# Patient Record
Sex: MALE | Race: WHITE | NOT HISPANIC OR LATINO | Employment: OTHER | URBAN - METROPOLITAN AREA
[De-identification: names, ages, dates, MRNs, and addresses within clinical notes are randomized per-mention and may not be internally consistent; named-entity substitution may affect disease eponyms.]

---

## 2017-02-27 DIAGNOSIS — N52.8 OTHER MALE ERECTILE DYSFUNCTION: ICD-10-CM

## 2017-02-27 NOTE — TELEPHONE ENCOUNTER
Patient called for refill of viagra.  He also scheduled his annual physical for 3/21/17.   If cannot be filled, please call to advise patient. Ok to leave detailed message.  Please send to [facility] on [address] if approved.

## 2017-03-01 RX ORDER — SILDENAFIL 50 MG/1
25-100 TABLET, FILM COATED ORAL DAILY PRN
Qty: 6 TABLET | Refills: 0 | Status: CANCELLED | OUTPATIENT
Start: 2017-03-01

## 2017-03-01 NOTE — TELEPHONE ENCOUNTER
Routing refill request to provider for review/approval because:  -writer notes patient has history of STEMI and overdue for annual office visit, defer to provider    Routing to PCP.    Dr. Sanchez-Please sign if agree.    Thank you!  MARIELLE JohnsonN, RN      sildenafil (VIAGRA) 50 MG tablet      Last Written Prescription Date: 7/2/15  Last Fill Quantity: 6,  # refills: 11   Last Office Visit with [facility], [facility] or [facility] prescribing provider: 2/19/16 with Dr. Sanchez                                         Next 5 appointments (look out 90 days)     Mar 21, 2017 11:00 AM CDT   [facility]    [address] [place] [id number] [phone]            May 22, 2017 10:15 AM CDT   Return Visit with Merrick Plaza MD   [facility] AT [place] ([facility])    [address] [place] [id number] [phone]

## 2017-03-01 NOTE — TELEPHONE ENCOUNTER
Called patient and reviewed options per below from Dr. Sanchez.    Per patient:  1. He will wait until 3/21/17 office visit with Dr. Sanchez to address Viagra refill.    No further action needed from triage.    Closing encounter.    MARIELLE JohnsonN, RN

## 2017-03-07 DIAGNOSIS — I25.10 CORONARY ARTERY DISEASE INVOLVING NATIVE HEART WITHOUT ANGINA PECTORIS, UNSPECIFIED VESSEL OR LESION TYPE: Primary | ICD-10-CM

## 2017-03-07 NOTE — TELEPHONE ENCOUNTER
Disp Refills Start End CHANDLER   atorvastatin (LIPITOR) 80 MG tablet 30 tablet 11 2/19/2016  No   Sig: Take 1 tablet (80 mg) by mouth At Bedtime     Last Office Visit with [facility] or [facility] prescribing provider: 2-19-16    Next 5 appointments (look out 90 days)     Mar 21, 2017 11:00 AM CDT   PHYSICAL with Montrell Sanchez MD   [facility] ([address] [address]   [phone]            May 22, 2017 10:15 AM CDT   Return Visit with Merrick Plaza MD   [facility] ([facility])    [facility]  [facility] [id number] [phone]                   Lab Results   Component Value Date    CHOL 126 05/05/2016     Lab Results   Component Value Date    HDL 38 05/05/2016     Lab Results   Component Value Date    LDL 69 05/05/2016     07/02/2015     Lab Results   Component Value Date    TRIG 97 05/05/2016     Lab Results   Component Value Date    CHOLHDLRATIO 7.3 07/02/2015            Disp Refills Start End CHANDLER   lisinopril (PRINIVIL,ZESTRIL) 5 MG tablet 30 tablet 11 2/19/2016  No   Sig: Take 1 tablet (5 mg) by mouth daily       Last Office Visit with [facility], [facility] or [facility] prescribing provider: 2-19-16    Next 5 appointments (look out 90 days)     Mar 21, 2017 11:00 AM CDT   PHYSICAL with Montrell Sanchez MD   [facility] ([facility])    [address] [place] [id number]   [phone]            May 22, 2017 10:15 AM CDT   Return Visit with Merrick Plaza MD   [facility] ([facility])    [address]  [facility] [address] [phone]                   Potassium   Date Value Ref Range Status   02/19/2016 4.3 3.4 - 5.3 mmol/L Final     Creatinine   Date Value Ref Range Status   02/19/2016 0.97 0.66 - 1.25 mg/dL Final     BP Readings from Last 3 Encounters:    05/09/16 104/62   03/02/16 106/70   02/19/16 94/62            Disp Refills Start End CHANDLER   metoprolol (LOPRESSOR) 25 MG tablet 62 tablet 11 2/19/2016  No   Sig: Take 1 tablet (25 mg) by mouth 2 times daily       [facility] Visit with [email or web address] or [facility] prescribing provider:  2-19-16     Next 5 appointments (look out 90 days)     Mar 21, 2017 11:00 AM CDT   PHYSICAL with Montrell Sanchez MD   [facility] ([facility])    [address] [place] [address]   [phone]            May 22, 2017 10:15 AM CDT   Return Visit with Merrick Plaza MD   [facility] AT [place] ([email or web address])    [address] [place] [address]   [phone]                   BP Readings from Last 3 Encounters:   05/09/16 104/62   03/02/16 106/70   02/19/16 94/62     No results found for: MICROL  No results found for: MICROALBUMIN  Creatinine   Date Value Ref Range Status   02/19/2016 0.97 0.66 - 1.25 mg/dL Final   ]  GFR Estimate   Date Value Ref Range Status   02/19/2016 82 >60 mL/min/1.7m2 Final     Comment:     Non  GFR Calc   02/15/2016 76 >60 mL/min/1.7m2 Corrected     Comment:     CORRECTED ON 02/15 AT 0723: PREVIOUSLY REPORTED AS 76 Non  GFR   Calc     02/14/2016 80 >60 mL/min/1.7m2 Final     Comment:     Non  GFR Calc     GFR Estimate If Black   Date Value Ref Range Status   02/19/2016 >90   GFR Calc   >60 mL/min/1.7m2 Final   02/15/2016  >60 mL/min/1.7m2 Corrected    >90  CORRECTED ON 02/15 AT 0723: PREVIOUSLY REPORTED AS >90  GFR Calc     02/14/2016 >90   GFR Calc   >60 mL/min/1.7m2 Final     Lab Results   Component Value Date    CHOL 126 05/05/2016     Lab Results   Component Value Date    HDL 38 05/05/2016     Lab Results   Component Value Date    LDL 69 05/05/2016     07/02/2015     Lab Results   Component Value Date     TRIG 97 05/05/2016     Lab Results   Component Value Date    CHOLHDLRATIO 7.3 07/02/2015     Lab Results   Component Value Date     02/15/2016     Lab Results   Component Value Date    ALT 34 05/05/2016     Lab Results   Component Value Date    A1C 5.8 07/02/2015     Potassium   Date Value Ref Range Status   02/19/2016 4.3 3.4 - 5.3 mmol/L Final            Disp Refills Start End CHANDLER   ticagrelor (BRILINTA) 90 MG tablet 62 tablet 11 2/19/2016  No   Sig: Take 1 tablet (90 mg) by mouth every 12 hours      Last Office Visit with [facility], [facility] or [facility] prescribing provider:  2-19-16     Future Office Visit:    Next 5 appointments (look out 90 days)     Mar 21, 2017 11:00 AM CDT   PHYSICAL with Montrell Sanchez MD   [facility])    [address] [place] [address]   [phone]            May 22, 2017 10:15 AM CDT   Return Visit with Merrick Plaza MD   [facility] [place] ([facility])    [address] [place] [id number]   [phone]                   Lab Results   Component Value Date    WBC 7.0 02/15/2016     Lab Results   Component Value Date    RBC 5.06 02/15/2016     Lab Results   Component Value Date    HGB 15.6 02/15/2016     Lab Results   Component Value Date    HCT 45.3 02/15/2016     No components found for: MCT  Lab Results   Component Value Date    MCV 90 02/15/2016     Lab Results   Component Value Date    MCH 30.8 02/15/2016     Lab Results   Component Value Date    MCHC 34.4 02/15/2016     Lab Results   Component Value Date    RDW 13.2 02/15/2016     Lab Results   Component Value Date     02/15/2016     Lab Results   Component Value Date     02/15/2016     Lab Results   Component Value Date    ALT 34 05/05/2016     Creatinine   Date Value Ref Range Status   02/19/2016 0.97 0.66 - 1.25 mg/dL Final   ]

## 2017-03-09 RX ORDER — METOPROLOL TARTRATE 25 MG/1
25 TABLET, FILM COATED ORAL 2 TIMES DAILY
Qty: 60 TABLET | Refills: 0 | Status: SHIPPED | OUTPATIENT
Start: 2017-03-09 | End: 2017-05-22

## 2017-03-09 RX ORDER — LISINOPRIL 5 MG/1
5 TABLET ORAL DAILY
Qty: 30 TABLET | Refills: 0 | Status: SHIPPED | OUTPATIENT
Start: 2017-03-09 | End: 2017-05-22

## 2017-03-09 RX ORDER — ATORVASTATIN CALCIUM 80 MG/1
80 TABLET, FILM COATED ORAL AT BEDTIME
Qty: 30 TABLET | Refills: 0 | Status: SHIPPED | OUTPATIENT
Start: 2017-03-09 | End: 2017-05-22

## 2017-03-21 ENCOUNTER — OFFICE VISIT (OUTPATIENT)
Dept: FAMILY MEDICINE | Facility: CLINIC | Age: 52
End: 2017-03-21
Payer: COMMERCIAL

## 2017-03-21 VITALS
TEMPERATURE: 97.6 F | DIASTOLIC BLOOD PRESSURE: 70 MMHG | OXYGEN SATURATION: 100 % | SYSTOLIC BLOOD PRESSURE: 100 MMHG | WEIGHT: 187 LBS | HEART RATE: 64 BPM | BODY MASS INDEX: 27.7 KG/M2 | HEIGHT: 69 IN | RESPIRATION RATE: 16 BRPM

## 2017-03-21 DIAGNOSIS — N52.9 ERECTILE DYSFUNCTION, UNSPECIFIED ERECTILE DYSFUNCTION TYPE: ICD-10-CM

## 2017-03-21 DIAGNOSIS — Z00.01 ENCOUNTER FOR ROUTINE ADULT MEDICAL EXAM WITH ABNORMAL FINDINGS: Primary | ICD-10-CM

## 2017-03-21 DIAGNOSIS — I25.10 CORONARY ARTERY DISEASE INVOLVING NATIVE HEART WITHOUT ANGINA PECTORIS, UNSPECIFIED VESSEL OR LESION TYPE: ICD-10-CM

## 2017-03-21 PROCEDURE — 80061 LIPID PANEL: CPT | Performed by: FAMILY MEDICINE

## 2017-03-21 PROCEDURE — 80048 BASIC METABOLIC PNL TOTAL CA: CPT | Performed by: FAMILY MEDICINE

## 2017-03-21 PROCEDURE — 36415 COLL VENOUS BLD VENIPUNCTURE: CPT | Performed by: FAMILY MEDICINE

## 2017-03-21 PROCEDURE — 99396 PREV VISIT EST AGE 40-64: CPT | Performed by: FAMILY MEDICINE

## 2017-03-21 RX ORDER — SILDENAFIL CITRATE 20 MG/1
20-100 TABLET ORAL DAILY PRN
Qty: 30 TABLET | Refills: 0 | Status: SHIPPED
Start: 2017-03-21 | End: 2018-08-09

## 2017-03-21 NOTE — PROGRESS NOTES
SUBJECTIVE:     CC: Tip Aguilar is an 52 year old male who presents for preventative health visit.     Physical   Annual:     Getting at least 3 servings of Calcium per day::  Yes    Bi-annual eye exam::  NO    Dental care twice a year::  NO    Sleep apnea or symptoms of sleep apnea::  None    Diet::  Low fat/cholesterol    Frequency of exercise::  2-3 days/week    Duration of exercise::  15-30 minutes    Taking medications regularly::  Yes    Medication side effects::  Not applicable    Additional concerns today::  No    -------------------------------------    Today's PHQ-2 Score:   PHQ-2 ( 1999 [employer]) 3/21/2017   Q1: Little interest or pleasure in doing things -   Q2: Feeling down, depressed or hopeless -   PHQ-2 Score -   Little interest or pleasure in doing things Not at all   Feeling down, depressed or hopeless Not at all   PHQ-2 Score 0       Abuse: Current or Past(Physical, Sexual or Emotional)- No  Do you feel safe in your environment - Yes    Social History   Substance Use Topics     Smoking status: Never Smoker     Smokeless tobacco: Not on file     Alcohol use 0.0 oz/week     0 Standard drinks or equivalent per week      Comment: beer or wine     The patient does not drink >3 drinks per day nor >7 drinks per week.    Last PSA: No results found for: PSA    Recent Labs   Lab Test  05/05/16   0729  02/13/16   1320  07/02/15   0850   CHOL  126  202*  248*   HDL  38*  32*  34*   LDL  69  133*  Cannot estimate LDL when triglyceride exceeds 400 mg/dL  152*   TRIG  97  184*  415*   CHOLHDLRATIO   --    --   7.3*   NHDL  88  170*   --        Reviewed orders with patient. Reviewed health maintenance and updated orders accordingly - Yes    Reviewed and updated as needed this visit by clinical staff       Reviewed and updated as needed this visit by Provider        ROS:  C: NEGATIVE for fever, chills, change in weight  I: NEGATIVE for worrisome rashes, moles or lesions  E: NEGATIVE for vision changes or  "irritation  ENT: NEGATIVE for ear, mouth and throat problems  R: NEGATIVE for significant cough or SOB  CV: NEGATIVE for chest pain, palpitations or peripheral edema  GI: NEGATIVE for nausea, abdominal pain, heartburn, or change in bowel habits   male: erectile trouble worse. viagra at 50mg not all that helpful.   M: NEGATIVE for significant arthralgias or myalgia  N: NEGATIVE for weakness, dizziness or paresthesias  P: NEGATIVE for changes in mood or affect    Problem list, Medication list, Allergies, and Medical/Social/Surgical histories reviewed in Psychiatric and updated as appropriate.  OBJECTIVE:     /70 (BP Location: Left arm, Patient Position: Chair, Cuff Size: Adult Regular)  Pulse 64  Temp 97.6  F (36.4  C) (Oral)  Resp 16  Ht 5' 8.75\" (1.746 m)  Wt 187 lb (84.8 kg)  SpO2 100%  BMI 27.82 kg/m2  EXAM:  GENERAL: healthy, alert and no distress  EYES: Eyes grossly normal to inspection  HENT: ear canals and TM's normal, nose and mouth without ulcers or lesions  NECK: no adenopathy, no asymmetry, masses, or scars and thyroid normal to palpation  RESP: lungs clear to auscultation - no rales, rhonchi or wheezes  CV: regular rate and rhythm, normal S1 S2, no S3 or S4, no murmur, click or rub, no peripheral edema and peripheral pulses strong  ABDOMEN: soft, nontender, no hepatosplenomegaly, no masses and bowel sounds normal  MS: no gross musculoskeletal defects noted, no edema  SKIN: no suspicious lesions or rashes  NEURO: Normal strength and tone, mentation intact and speech normal  PSYCH: mentation appears normal, affect normal/bright    ASSESSMENT/PLAN:         ICD-10-CM    1. Encounter for routine adult medical exam with abnormal findings Z00.01 Lipid Profile with reflex to direct LDL     GASTROENTEROLOGY ADULT REF PROCEDURE ONLY     GASTROENTEROLOGY ADULT REF PROCEDURE ONLY   2. Coronary artery disease involving native heart without angina pectoris, unspecified vessel or lesion type I25.10 Basic metabolic " "panel   3. Erectile dysfunction, unspecified erectile dysfunction type N52.9 sildenafil (REVATIO/VIAGRA) 20 MG tablet   will check on duration for the brillinta and if cards has any trouble with me lowering the dose of metoprolol. Increase viagra up to 100mg dose for a trial.     COUNSELING:   Reviewed preventive health counseling, as reflected in patient instructions       Regular exercise       Healthy diet/nutrition       Colon cancer screening       reports that he has never smoked. He does not have any smokeless tobacco history on file.    Estimated body mass index is 27.82 kg/(m^2) as calculated from the following:    Height as of this encounter: 5' 8.75\" (1.746 m).    Weight as of this encounter: 187 lb (84.8 kg).   Weight management plan: Discussed healthy diet and exercise guidelines and patient will follow up in 12 months in clinic to re-evaluate.    Counseling Resources:  ATP IV Guidelines  Pooled Cohorts Equation Calculator  FRAX Risk Assessment  ICSI Preventive Guidelines  Dietary Guidelines for Americans, 2010  USDA's MyPlate  ASA Prophylaxis  Lung CA Screening    Montrell Sanchez MD  [facility]  Answers for HPI/ROS submitted by the patient on 3/21/2017   Q1: Little interest or pleasure in doing things: 0=Not at all  Q2: Feeling down, depressed or hopeless: 0=Not at all  PHQ-2 Score: 0    "

## 2017-03-21 NOTE — MR AVS SNAPSHOT
After Visit Summary   3/21/2017    Tip Aguilar    MRN: [id number]           Patient Information     Date Of Birth          1965        Visit Information        Provider Department      3/21/2017 11:00 AM Montrell Sanchez MD [facility]        Today's Diagnoses     Encounter for routine adult medical exam with abnormal findings    -  1    Coronary artery disease involving native heart without angina pectoris, unspecified vessel or lesion type        Erectile dysfunction, unspecified erectile dysfunction type          Care Instructions      Preventive Health Recommendations  Male Ages 50 - 64    Yearly exam:             See your health care provider every year in order to  o   Review health changes.   o   Discuss preventive care.    o   Review your medicines if your doctor has prescribed any.     Have a cholesterol test every 5 years, or more frequently if you are at risk for high cholesterol/heart disease.     Have a diabetes test (fasting glucose) every three years. If you are at risk for diabetes, you should have this test more often.     Have a colonoscopy at age 50, or have a yearly FIT test (stool test). These exams will check for colon cancer.      Talk with your health care provider about whether or not a prostate cancer screening test (PSA) is right for you.    You should be tested each year for STDs (sexually transmitted diseases), if you re at risk.     Shots: Get a flu shot each year. Get a tetanus shot every 10 years.     Nutrition:    Eat at least 5 servings of fruits and vegetables daily.     Eat whole-grain bread, whole-wheat pasta and brown rice instead of white grains and rice.     Talk to your provider about Calcium and Vitamin D.     Lifestyle    Exercise for at least 150 minutes a week (30 minutes a day, 5 days a week). This will help you control your weight and prevent disease.     Limit alcohol to one drink per day.     No smoking.     Wear sunscreen  to prevent skin cancer.     See your dentist every six months for an exam and cleaning.     See your eye doctor every 1 to 2 years.          Follow-ups after your visit        Additional Services     GASTROENTEROLOGY ADULT REF PROCEDURE ONLY       Last Lab Result: Creatinine (mg/dL)       Date                     Value                 02/19/2016               0.97             ----------  Body mass index is 27.82 kg/(m^2).     Needed:  No  Language:  English    Patient will be contacted to schedule procedure.     Please be aware that coverage of these services is subject to the terms and limitations of your health insurance plan.  Call member services at your health plan with any benefit or coverage questions.  Any procedures must be performed at a [facility] facility OR coordinated by your clinic's referral office.    Please bring the following with you to your appointment:    (1) Any X-Rays, CTs or MRIs which have been performed.  Contact the facility where they were done to arrange for  prior to your scheduled appointment.    (2) List of current medications   (3) This referral request   (4) Any documents/labs given to you for this referral            GASTROENTEROLOGY ADULT REF PROCEDURE ONLY       Last Lab Result: Creatinine (mg/dL)       Date                     Value                 02/19/2016               0.97             ----------  Body mass index is 27.82 kg/(m^2).     Needed:  No  Language:  English    Patient will be contacted to schedule procedure.     Please be aware that coverage of these services is subject to the terms and limitations of your health insurance plan.  Call member services at your health plan with any benefit or coverage questions.  Any procedures must be performed at a [facility] facility OR coordinated by your clinic's referral office.    Please bring the following with you to your appointment:    (1) Any X-Rays, CTs or MRIs which have been performed.  Contact  "the facility where they were done to arrange for  prior to your scheduled appointment.    (2) List of current medications   (3) This referral request   (4) Any documents/labs given to you for this referral                  Your next 10 appointments already scheduled     May 22, 2017 10:15 AM CDT   Return Visit with Merrick Plaza MD   [facility] ([facility])    [address] [place] [id number] [phone]              Who to contact     If you have questions or need follow up information about today's clinic visit or your schedule please contact [facility] directly at [phone].  Normal or non-critical lab and imaging results will be communicated to you by MyChart, letter or phone within 4 business days after the clinic has received the results. If you do not hear from us within 7 days, please contact the clinic through MyChart or phone. If you have a critical or abnormal lab result, we will notify you by phone as soon as possible.  Submit refill requests through [employer] or call your pharmacy and they will forward the refill request to us. Please allow 3 business days for your refill to be completed.          Additional Information About Your Visit        [employer] Information     [employer] lets you send messages to your doctor, view your test results, renew your prescriptions, schedule appointments and more. To sign up, go to [email or web address] . Click on \"Log in\" on the left side of the screen, which will take you to the Welcome page. Then click on \"Sign up Now\" on the right side of the page.     You will be asked to enter the access code listed below, as well as some personal information. Please follow the directions to create your username and password.     Your access code is: [email or web address]  Expires: 2017 11:23 AM     Your access code will  in 90 days. If you need help or a new code, please call " "your [facility] clinic or [phone].        Care EveryWhere ID     This is your Care EveryWhere ID. This could be used by other organizations to access your [facility] medical records  [id number]        Your Vitals Were     Pulse Temperature Respirations Height Pulse Oximetry BMI (Body Mass Index)    64 97.6  F (36.4  C) (Oral) 16 5' 8.75\" (1.746 m) 100% 27.82 kg/m2       Blood Pressure from Last 3 Encounters:   03/21/17 100/70   05/09/16 104/62   03/02/16 106/70    Weight from Last 3 Encounters:   03/21/17 187 lb (84.8 kg)   05/09/16 191 lb (86.6 kg)   03/02/16 200 lb (90.7 kg)              We Performed the Following     Basic metabolic panel     GASTROENTEROLOGY ADULT REF PROCEDURE ONLY     GASTROENTEROLOGY ADULT REF PROCEDURE ONLY     Lipid Profile with reflex to direct LDL          Today's Medication Changes          These changes are accurate as of: 3/21/17 11:23 AM.  If you have any questions, ask your nurse or doctor.               Start taking these medicines.        Dose/Directions    sildenafil 20 MG tablet   Commonly known as:  REVATIO/VIAGRA   Used for:  Erectile dysfunction, unspecified erectile dysfunction type   Started by:  Mnotrell Sanchez MD        Dose:   mg   Take 1-5 tablets ( mg) by mouth daily as needed Take 1 tablet (20 mg) by mouth three times daily for pulmonary hypertension.  Never use with nitroglycerin, terazosin or doxazosin.   Quantity:  30 tablet   Refills:  0            Where to get your medicines      Call your pharmacy to confirm that your medication is ready for pickup. It may take up to 24 hours for them to receive the prescription. If the prescription is not ready within 3 business days, please contact your clinic or your provider.     We will let you know when these medications are ready. If you don't hear back within 3 business days, please contact us.     sildenafil 20 MG tablet                Primary Care Provider Office Phone # Fax #    Montrell Sanchez MD " [phone]       [facility] [address]  [facility] [address]        Goals        General    Functional Patient will contact Cardiac Rehab while not able to play hockey and sign up for the rehab program (pt-stated)     Notes - Note created  3/3/2016  3:33 PM by Bailey Garcia, RN    As of today's date 3/3/2016 goal is met at 0 - 25%.   Goal Status:  Active        Medication 1 Patient will adjust medications to match his schedule so that all of the medications will be taken on time.  (pt-stated)     Notes - Note created  3/3/2016  3:26 PM by Bailey Garcia, RN    As of today's date 3/3/2016 goal is met at 0 - 25%.   Goal Status:  Active        Medication 1 Patient will carry his nitroglycerin with him wherever he goes and take as prescribed if needed (pt-stated)     Notes - Note created  3/3/2016  3:30 PM by Bailey Garcia RN    As of today's date 3/3/2016 goal is met at 0 - 25%.   Goal Status:  Active        Medication 3 Patient will refill the nitroglycerine every 6 months whether the bottle has been opended or not (pt-stated)     Notes - Note created  3/3/2016  3:32 PM by Bailey Garcia, RN    As of today's date 3/3/2016 goal is met at 0 - 25%.   Goal Status:  Active          Thank you!     Thank you for choosing [facility]  for your care. Our goal is always to provide you with excellent care. Hearing back from our patients is one way we can continue to improve our services. Please take a few minutes to complete the written survey that you may receive in the mail after your visit with us. Thank you!             Your Updated Medication List - Protect others around you: Learn how to safely use, store and throw away your medicines at www.disposemymeds.org.          This list is accurate as of: 3/21/17 11:23 AM.  Always use your most recent med list.                   Brand Name Dispense Instructions for use    aspirin 81 MG tablet     30 tablet    Take 1 tablet (81  mg) by mouth daily       atorvastatin 80 MG tablet    LIPITOR    30 tablet    Take 1 tablet (80 mg) by mouth At Bedtime       lisinopril 5 MG tablet    PRINIVIL/ZESTRIL    30 tablet    Take 1 tablet (5 mg) by mouth daily       metoprolol 25 MG tablet    LOPRESSOR    60 tablet    Take 1 tablet (25 mg) by mouth 2 times daily       nitroglycerin 0.4 MG sublingual tablet    NITROSTAT    25 tablet    Place 1 tablet (0.4 mg) under the tongue every 5 minutes as needed for chest pain if you are still having symptoms after 3 doses (15 minutes) call 911.       sildenafil 20 MG tablet    REVATIO/VIAGRA    30 tablet    Take 1-5 tablets ( mg) by mouth daily as needed Take 1 tablet (20 mg) by mouth three times daily for pulmonary hypertension.  Never use with nitroglycerin, terazosin or doxazosin.       sildenafil 50 MG cap/tab    REVATIO/VIAGRA    6 tablet    Take 0.5-2 tablets ( mg) by mouth daily as needed for erectile dysfunction Take 30 min to 4 hours before intercourse.  Never use with nitroglycerin, terazosin or doxazosin.       ticagrelor 90 MG tablet    BRILINTA    62 tablet    Take 1 tablet (90 mg) by mouth 2 times daily

## 2017-03-21 NOTE — Clinical Note
Dr. Plaza. I saw Tip tapia today. He had S/p urgent PCI to distal and mid-RCA/rPLA with 2 PAVITHRA and PTCA of rPDA about 13 months ago on brillinta. He ran out o fthis a month ago. Can he stop at this point or should I restart? He is to see you in 2 months.   Also he is on metoprolol due to the above. Can I lower the dose on this since it has been a year. His bp is great. . He is having some erectile dysfunction and this may be contributing.  Thanks for you help.   Montrell Sanchez

## 2017-03-21 NOTE — NURSING NOTE
"  Chief Complaint   Patient presents with     Physical       Initial /70 (BP Location: Left arm, Patient Position: Chair, Cuff Size: Adult Regular)  Pulse 64  Temp 97.6  F (36.4  C) (Oral)  Resp 16  Ht 5' 8.75\" (1.746 m)  Wt 187 lb (84.8 kg)  SpO2 100%  BMI 27.82 kg/m2 Estimated body mass index is 27.82 kg/(m^2) as calculated from the following:    Height as of this encounter: 5' 8.75\" (1.746 m).    Weight as of this encounter: 187 lb (84.8 kg).  Medication Reconciliation: complete     Mony Meneses CMA      "

## 2017-03-21 NOTE — LETTER
[facility]   [address] [place] [address]  [id number]  [phone]      March 23, 2017      Tip Larson   [address]  [place] [address]              Dear Mr. Aguilar,    Tip your cholesterol is elevated. Have you been off the lipitor? I think we should restart this. Recheck with your cardilogy visits in a couple months.     The cardiologist thinks we can cut back on the metorpolol. Lets have you decrease to 1/2 pill twice daily.     Also you can stay off the brillinta.     Electrolytes and kidney tests are normal.     Your blood sugar is normal. There is no evidence of diabetes.    I will have my nurse try to call you with the results to make sure you get the message.    Results for orders placed or performed in visit on 03/21/17   Lipid Profile with reflex to direct LDL   Result Value Ref Range    Cholesterol 223 (H) <200 mg/dL    Triglycerides 231 (H) <150 mg/dL    HDL Cholesterol 45 >39 mg/dL    LDL Cholesterol Calculated 132 (H) <100 mg/dL    Non HDL Cholesterol 178 (H) <130 mg/dL   Basic metabolic panel   Result Value Ref Range    Sodium 139 133 - 144 mmol/L    Potassium 4.1 3.4 - 5.3 mmol/L    Chloride 102 94 - 109 mmol/L    Carbon Dioxide 26 20 - 32 mmol/L    Anion Gap 11 3 - 14 mmol/L    Glucose 99 70 - 99 mg/dL    Urea Nitrogen 15 7 - 30 mg/dL    Creatinine 0.92 0.66 - 1.25 mg/dL    GFR Estimate 87 >60 mL/min/1.7m2    GFR Estimate If Black >90   GFR Calc   >60 mL/min/1.7m2    Calcium 8.8 8.5 - 10.1 mg/dL           Sincerely,    Montrell Sanchez MD/nr

## 2017-03-22 ENCOUNTER — TELEPHONE (OUTPATIENT)
Dept: FAMILY MEDICINE | Facility: CLINIC | Age: 52
End: 2017-03-22

## 2017-03-22 LAB
ANION GAP SERPL CALCULATED.3IONS-SCNC: 11 MMOL/L (ref 3–14)
BUN SERPL-MCNC: 15 MG/DL (ref 7–30)
CALCIUM SERPL-MCNC: 8.8 MG/DL (ref 8.5–10.1)
CHLORIDE SERPL-SCNC: 102 MMOL/L (ref 94–109)
CHOLEST SERPL-MCNC: 223 MG/DL
CO2 SERPL-SCNC: 26 MMOL/L (ref 20–32)
CREAT SERPL-MCNC: 0.92 MG/DL (ref 0.66–1.25)
GFR SERPL CREATININE-BSD FRML MDRD: 87 ML/MIN/1.7M2
GLUCOSE SERPL-MCNC: 99 MG/DL (ref 70–99)
HDLC SERPL-MCNC: 45 MG/DL
LDLC SERPL CALC-MCNC: 132 MG/DL
NONHDLC SERPL-MCNC: 178 MG/DL
POTASSIUM SERPL-SCNC: 4.1 MMOL/L (ref 3.4–5.3)
SODIUM SERPL-SCNC: 139 MMOL/L (ref 133–144)
TRIGL SERPL-MCNC: 231 MG/DL

## 2017-03-22 NOTE — TELEPHONE ENCOUNTER
Tip your cholesterol is elevated. Have you been off the lipitor? I think we should restart this. Recheck with your cardilogy visits in a couple months.     The cardiologist thinks we can cut back on the metorpolol. Lets have you decrease to 1/2 pill twice daily.     Also you can stay off the brillinta.     Electrolytes and kidney tests are normal.     Your blood sugar is normal. There is no evidence of diabetes.    I will have my nurse try to call you with the results to make sure you get the message.    Montrell Sanchez

## 2017-03-22 NOTE — TELEPHONE ENCOUNTER
CHRIS    Writer communicated results to patient per Dr. Sanchez. Patient states he has not been off of Lipitor and has been taking it as prescribed. Patient instructed to follow up and recheck lipids at Cardiologist appt. In a couple of months. Patient in agreement with plan and verbalizes understanding.   Thanks!   Anushka Meza RN

## 2017-03-23 ENCOUNTER — TELEPHONE (OUTPATIENT)
Dept: FAMILY MEDICINE | Facility: CLINIC | Age: 52
End: 2017-03-23

## 2017-03-23 NOTE — TELEPHONE ENCOUNTER
Og SIERRA sent denial because med use is for treatment of pulmonary arterial hypertension. Put in abstract.vClose encounter when you reviewed this unless you would like me to do something further. Mony Meneses CMA

## 2017-04-12 DIAGNOSIS — I21.11 ST ELEVATION MYOCARDIAL INFARCTION INVOLVING RIGHT CORONARY ARTERY (H): ICD-10-CM

## 2017-04-14 RX ORDER — LISINOPRIL 5 MG/1
TABLET ORAL
Qty: 30 TABLET | Refills: 11 | Status: SHIPPED | OUTPATIENT
Start: 2017-04-14 | End: 2017-05-22

## 2017-04-14 NOTE — TELEPHONE ENCOUNTER
Next 5 appointments (look out 90 days)     May 22, 2017 10:15 AM CDT   Return Visit with Merrick Plaza MD   [facility] ([facility])    [facility] [address] [id number] [phone]                   Potassium   Date Value Ref Range Status   03/21/2017 4.1 3.4 - 5.3 mmol/L Final     Creatinine   Date Value Ref Range Status   03/21/2017 0.92 0.66 - 1.25 mg/dL Final     BP Readings from Last 3 Encounters:   03/21/17 100/70   05/09/16 104/62   03/02/16 106/70

## 2017-05-22 ENCOUNTER — OFFICE VISIT (OUTPATIENT)
Dept: CARDIOLOGY | Facility: CLINIC | Age: 52
End: 2017-05-22
Attending: INTERNAL MEDICINE
Payer: COMMERCIAL

## 2017-05-22 VITALS
BODY MASS INDEX: 28.73 KG/M2 | HEIGHT: 69 IN | WEIGHT: 194 LBS | DIASTOLIC BLOOD PRESSURE: 72 MMHG | HEART RATE: 60 BPM | SYSTOLIC BLOOD PRESSURE: 116 MMHG

## 2017-05-22 DIAGNOSIS — I25.10 CORONARY ARTERY DISEASE INVOLVING NATIVE HEART WITHOUT ANGINA PECTORIS, UNSPECIFIED VESSEL OR LESION TYPE: ICD-10-CM

## 2017-05-22 DIAGNOSIS — I77.810 ASCENDING AORTA DILATION (H): ICD-10-CM

## 2017-05-22 DIAGNOSIS — I25.10 CORONARY ARTERY DISEASE INVOLVING NATIVE CORONARY ARTERY OF NATIVE HEART WITHOUT ANGINA PECTORIS: Primary | ICD-10-CM

## 2017-05-22 PROCEDURE — 99214 OFFICE O/P EST MOD 30 MIN: CPT | Performed by: INTERNAL MEDICINE

## 2017-05-22 RX ORDER — ATORVASTATIN CALCIUM 80 MG/1
80 TABLET, FILM COATED ORAL AT BEDTIME
Qty: 90 TABLET | Refills: 3 | Status: SHIPPED | OUTPATIENT
Start: 2017-05-22 | End: 2018-06-12

## 2017-05-22 RX ORDER — LISINOPRIL 5 MG/1
5 TABLET ORAL DAILY
Qty: 90 TABLET | Refills: 3 | Status: SHIPPED | OUTPATIENT
Start: 2017-05-22 | End: 2018-06-12

## 2017-05-22 RX ORDER — METOPROLOL TARTRATE 25 MG/1
25 TABLET, FILM COATED ORAL 2 TIMES DAILY
Qty: 180 TABLET | Refills: 3 | Status: SHIPPED | OUTPATIENT
Start: 2017-05-22 | End: 2018-06-12

## 2017-05-22 NOTE — PROGRESS NOTES
REASON FOR VISIT:  Followup for coronary artery disease.      HISTORY OF PRESENT ILLNESS:  I had the pleasure of seeing Mr. Aguilar at the [facility] in [place] this morning.  He is a very pleasant 52-year-old gentleman with known coronary artery disease and hyperlipidemia.  He suffered an inferior ST elevation MI in 02/2016.  Coronary angiogram at that time demonstrated an occluded large right posterolateral branch and no significant disease in his left coronary system.  He subsequently underwent successful PCI with drug-eluting stent placement in the right posterolateral branch and the mid RCA.      His echocardiogram post-MI showed preserved LV function with an EF of 55%-60% and no regional wall motion abnormality.  That echocardiogram also demonstrated mild ascending aortic dilatation.      Since his myocardial infarction, the patient has remained stable from a cardiac standpoint.  He is active and exercises on a daily basis.  He denies any exertional chest pain or shortness of breath.  His blood pressure is well controlled with the current medical program.  He was not on statin when he had his MI.  He was placed on atorvastatin 80 mg daily.  His LDL prior to his MI was 133.  After the initiation of atorvastatin, his LDL dropped to 69.  Unfortunately, he stopped taking atorvastatin.  He is not sure why this happened.  Nonetheless, a repeat lipid profile 2 months ago showed an LDL of 132 and total cholesterol of 223.      IMPRESSION, REPORT AND PLAN:   1.  Coronary artery disease.   2.  Status post prior inferior myocardial infarction.   3.  Status post PCI with drug-eluting stent placement in the mid RCA and right posterolateral branch.   4.  Hyperlipidemia.   5.  Hypertension.      Mr. Aguilar remains stable from a cardiopulmonary point of view.  He denies any exertional chest pain or shortness of breath.  His risk factors are well controlled with the exception of the hyperlipidemia.       I did review his laboratory studies that were performed a few months ago.  He is not sure why the atorvastatin was dropped off from his medical program.  I recommended that he resumes atorvastatin.  I gave him a new prescription for 80 mg of Lipitor to be taken once a day.      Since he completed dual antiplatelet therapy for more than 12 months, I recommended that he discontinue the ticagrelor.  He will continue aspirin lifelong.      We will repeat another echocardiogram in approximately 1 year to follow up on the mild ascending aortic dilatation.  We will see him at that time for followup as well.        I appreciate the opportunity to be part of this patient's care.         SHRUTI DE LA VEGA MD             D: 2017 11:43   T: 2017 18:28   MT: WAGNER      Name:     CLAIRE EMERSON   MRN:      [id number]        Account:      [id number]   :      1965           Service Date: 2017      Document: [id number]

## 2017-05-22 NOTE — MR AVS SNAPSHOT
"              After Visit Summary   5/22/2017    Tip Aguilar    MRN: [id number]           Patient Information     Date Of Birth          1965        Visit Information        Provider Department      5/22/2017 10:15 AM Merrick Plaza MD [facility]        Today's Diagnoses     Coronary artery disease involving native coronary artery of native heart without angina pectoris    -  1    Coronary artery disease involving native heart without angina pectoris, unspecified vessel or lesion type        Ascending aorta dilation (H)           Follow-ups after your visit        Additional Services     Follow-Up with Cardiologist                 Future tests that were ordered for you today     Open Future Orders        Priority Expected Expires Ordered    Follow-Up with Cardiologist Routine 5/22/2018 10/4/2018 5/22/2017    Echocardiogram Routine 5/22/2018 6/26/2018 5/22/2017            Who to contact     If you have questions or need follow up information about today's clinic visit or your schedule please contact [facility] directly at [phone].  Normal or non-critical lab and imaging results will be communicated to you by Kueskihart, letter or phone within 4 business days after the clinic has received the results. If you do not hear from us within 7 days, please contact the clinic through Sidewayz Pizzat or phone. If you have a critical or abnormal lab result, we will notify you by phone as soon as possible.  Submit refill requests through EadBox or call your pharmacy and they will forward the refill request to us. Please allow 3 business days for your refill to be completed.          Additional Information About Your Visit        Kueskihart Information     EadBox lets you send messages to your doctor, view your test results, renew your prescriptions, schedule appointments and more. To sign up, go to [email or web address] . Click on \"Log " "in\" on the left side of the screen, which will take you to the Welcome page. Then click on \"Sign up Now\" on the right side of the page.     You will be asked to enter the access code listed below, as well as some personal information. Please follow the directions to create your username and password.     Your access code is: [email or web address]  Expires: 2017 11:23 AM     Your access code will  in 90 days. If you need help or a new code, please call your [facility] clinic or [phone].        Care EveryWhere ID     This is your Care EveryWhere ID. This could be used by other organizations to access your [facility] medical records  [id number]        Your Vitals Were     Pulse Height BMI (Body Mass Index)             60 1.746 m (5' 8.75\") 28.86 kg/m2          Blood Pressure from Last 3 Encounters:   17 116/72   17 100/70   16 104/62    Weight from Last 3 Encounters:   17 88 kg (194 lb)   17 84.8 kg (187 lb)   16 86.6 kg (191 lb)              We Performed the Following     Follow-Up with Cardiologist          Today's Medication Changes          These changes are accurate as of: 17 11:44 AM.  If you have any questions, ask your nurse or doctor.               These medicines have changed or have updated prescriptions.        Dose/Directions    lisinopril 5 MG tablet   Commonly known as:  PRINIVIL/ZESTRIL   This may have changed:  Another medication with the same name was removed. Continue taking this medication, and follow the directions you see here.   Used for:  Coronary artery disease involving native heart without angina pectoris, unspecified vessel or lesion type   Changed by:  Merrick Plaza MD        Dose:  5 mg   Take 1 tablet (5 mg) by mouth daily   Quantity:  90 tablet   Refills:  3         Stop taking these medicines if you haven't already. Please contact your care team if you have questions.     ticagrelor 90 MG tablet   Commonly known as:  BRILINTA   Stopped " by:  Merrick Plaza MD                Where to get your medicines      These medications were sent to [id number] - [place], [place] - [address] AT [facility] [address]  [address], [place] [address]    Hours:  24-hours Phone:  [phone]     atorvastatin 80 MG tablet    lisinopril 5 MG tablet    metoprolol 25 MG tablet                Primary Care Provider Office Phone # Fax #    Montrell Nic Sanchez [phone]       [facility] [address]  [facility] [address]        Goals        General    Functional Patient will contact Cardiac Rehab while not able to play hockey and sign up for the rehab program (pt-stated)     Notes - Note created  3/3/2016  3:33 PM by Bailey Garcia, RN    As of today's date 3/3/2016 goal is met at 0 - 25%.   Goal Status:  Active        Medication 1 Patient will adjust medications to match his schedule so that all of the medications will be taken on time.  (pt-stated)     Notes - Note created  3/3/2016  3:26 PM by Bailey Garcia, RN    As of today's date 3/3/2016 goal is met at 0 - 25%.   Goal Status:  Active        Medication 1 Patient will carry his nitroglycerin with him wherever he goes and take as prescribed if needed (pt-stated)     Notes - Note created  3/3/2016  3:30 PM by Bailey Garcia, RN    As of today's date 3/3/2016 goal is met at 0 - 25%.   Goal Status:  Active        Medication 3 Patient will refill the nitroglycerine every 6 months whether the bottle has been opended or not (pt-stated)     Notes - Note created  3/3/2016  3:32 PM by Bailey Garcia, RN    As of today's date 3/3/2016 goal is met at 0 - 25%.   Goal Status:  Active          Thank you!     Thank you for choosing [facility] AT [place]  for your care. Our goal is always to provide you with excellent care. Hearing back from our patients is one way we can continue to improve our services. Please take a few minutes  to complete the written survey that you may receive in the mail after your visit with us. Thank you!             Your Updated Medication List - Protect others around you: Learn how to safely use, store and throw away your medicines at www.disposemymeds.org.          This list is accurate as of: 5/22/17 11:44 AM.  Always use your most recent med list.                   Brand Name Dispense Instructions for use    aspirin 81 MG tablet     30 tablet    Take 1 tablet (81 mg) by mouth daily       atorvastatin 80 MG tablet    LIPITOR    90 tablet    Take 1 tablet (80 mg) by mouth At Bedtime       lisinopril 5 MG tablet    PRINIVIL/ZESTRIL    90 tablet    Take 1 tablet (5 mg) by mouth daily       metoprolol 25 MG tablet    LOPRESSOR    180 tablet    Take 1 tablet (25 mg) by mouth 2 times daily       nitroglycerin 0.4 MG sublingual tablet    NITROSTAT    25 tablet    Place 1 tablet (0.4 mg) under the tongue every 5 minutes as needed for chest pain if you are still having symptoms after 3 doses (15 minutes) call 911.       sildenafil 20 MG tablet    REVATIO/VIAGRA    30 tablet    Take 1-5 tablets ( mg) by mouth daily as needed Take 1 tablet (20 mg) by mouth three times daily for pulmonary hypertension.  Never use with nitroglycerin, terazosin or doxazosin.       sildenafil 50 MG cap/tab    REVATIO/VIAGRA    6 tablet    Take 0.5-2 tablets ( mg) by mouth daily as needed for erectile dysfunction Take 30 min to 4 hours before intercourse.  Never use with nitroglycerin, terazosin or doxazosin.

## 2017-05-22 NOTE — PROGRESS NOTES
HPI and Plan:   See dictation    Orders Placed This Encounter   Procedures     Follow-Up with Cardiologist     Echocardiogram       Orders Placed This Encounter   Medications     atorvastatin (LIPITOR) 80 MG tablet     Sig: Take 1 tablet (80 mg) by mouth At Bedtime     Dispense:  90 tablet     Refill:  3     metoprolol (LOPRESSOR) 25 MG tablet     Sig: Take 1 tablet (25 mg) by mouth 2 times daily     Dispense:  180 tablet     Refill:  3     lisinopril (PRINIVIL/ZESTRIL) 5 MG tablet     Sig: Take 1 tablet (5 mg) by mouth daily     Dispense:  90 tablet     Refill:  3       Medications Discontinued During This Encounter   Medication Reason     lisinopril (PRINIVIL/ZESTRIL) 5 MG tablet      ticagrelor (BRILINTA) 90 MG tablet      atorvastatin (LIPITOR) 80 MG tablet Reorder     metoprolol (LOPRESSOR) 25 MG tablet Reorder     lisinopril (PRINIVIL/ZESTRIL) 5 MG tablet Reorder         Encounter Diagnoses   Name Primary?     Coronary artery disease involving native coronary artery of native heart without angina pectoris Yes     Coronary artery disease involving native heart without angina pectoris, unspecified vessel or lesion type      Ascending aorta dilation (H)        CURRENT MEDICATIONS:  Current Outpatient Prescriptions   Medication Sig Dispense Refill     atorvastatin (LIPITOR) 80 MG tablet Take 1 tablet (80 mg) by mouth At Bedtime 90 tablet 3     metoprolol (LOPRESSOR) 25 MG tablet Take 1 tablet (25 mg) by mouth 2 times daily 180 tablet 3     lisinopril (PRINIVIL/ZESTRIL) 5 MG tablet Take 1 tablet (5 mg) by mouth daily 90 tablet 3     sildenafil (REVATIO/VIAGRA) 20 MG tablet Take 1-5 tablets ( mg) by mouth daily as needed Take 1 tablet (20 mg) by mouth three times daily for pulmonary hypertension.  Never use with nitroglycerin, terazosin or doxazosin. 30 tablet 0     nitroglycerin (NITROSTAT) 0.4 MG SL tablet Place 1 tablet (0.4 mg) under the tongue every 5 minutes as needed for chest pain if you are still  having symptoms after 3 doses (15 minutes) call 911. 25 tablet 2     aspirin 81 MG tablet Take 1 tablet (81 mg) by mouth daily 30 tablet      sildenafil (VIAGRA) 50 MG tablet Take 0.5-2 tablets ( mg) by mouth daily as needed for erectile dysfunction Take 30 min to 4 hours before intercourse.  Never use with nitroglycerin, terazosin or doxazosin. 6 tablet 11     [DISCONTINUED] lisinopril (PRINIVIL/ZESTRIL) 5 MG tablet TAKE 1 TABLET BY MOUTH DAILY 30 tablet 11     [DISCONTINUED] atorvastatin (LIPITOR) 80 MG tablet Take 1 tablet (80 mg) by mouth At Bedtime 30 tablet 0     [DISCONTINUED] lisinopril (PRINIVIL/ZESTRIL) 5 MG tablet Take 1 tablet (5 mg) by mouth daily 30 tablet 0     [DISCONTINUED] metoprolol (LOPRESSOR) 25 MG tablet Take 1 tablet (25 mg) by mouth 2 times daily 60 tablet 0       ALLERGIES   No Known Allergies    PAST MEDICAL HISTORY:  Past Medical History:   Diagnosis Date     ASCVD (arteriosclerotic cardiovascular disease)      ED (erectile dysfunction)      STEMI (ST elevation myocardial infarction) (H)        PAST SURGICAL HISTORY:  Past Surgical History:   Procedure Laterality Date     CARDIAC CATHERIZATION  2/13/16    Successful PCI with PAVITHRA placement in the rPLA/distal RCA, mid RCA       FAMILY HISTORY:  Family History   Problem Relation Age of Onset     Coronary Artery Disease Father 58     heart valve replacement     Myocardial Infarction Father      DIABETES Mother        SOCIAL HISTORY:  Social History     Social History     Marital status: Single     Spouse name: N/A     Number of children: N/A     Years of education: N/A     Social History Main Topics     Smoking status: Never Smoker     Smokeless tobacco: None     Alcohol use 0.0 oz/week     0 Standard drinks or equivalent per week      Comment: beer or wine     Drug use: No     Sexual activity: Yes     Partners: Female     Birth control/ protection: Surgical     Other Topics Concern     Parent/Sibling W/ Cabg, Mi Or Angioplasty Before  "65f 55m? No     Caffeine Concern Yes     coffee in the am     Sleep Concern No     since surgery been sleepy \"Ok\"     Stress Concern Yes     self employed     Exercise Yes     uses treadmill daily     Social History Narrative       Review of Systems:  Skin:  Negative       Eyes:  Positive for glasses    ENT:  Negative      Respiratory:  Negative       Cardiovascular:  Negative      Gastroenterology: Negative      Genitourinary:  Negative      Musculoskeletal:  Negative      Neurologic:  Negative      Psychiatric:  Negative      Heme/Lymph/Imm:  Negative      Endocrine:  Negative        Physical Exam:  Vitals: /72  Pulse 60  Ht 1.746 m (5' 8.75\")  Wt 88 kg (194 lb)  BMI 28.86 kg/m2    Constitutional:  cooperative;in no acute distress        Skin:  warm and dry to the touch;no apparent skin lesions or masses noted        Head:  normocephalic        Eyes:           ENT:  no pallor or cyanosis        Neck:  carotid pulses are full and equal bilaterally;JVP normal        Chest:  clear to auscultation          Cardiac: regular rhythm;normal S1 and S2;no murmurs, gallops or rubs detected                  Abdomen:  abdomen soft;no masses;no bruits        Vascular: pulses full and equal                                        Extremities and Back:  no edema;normal muscle strength and tone              Neurological:  no gross motor deficits              CC  Merrick Plaza MD   [facility]  [address]  [place] [address]              "

## 2017-05-22 NOTE — LETTER
5/22/2017    Montrell Sanchez MD  [address]  [facility] [address]    RE: Tip Aguilar       Dear Colleague,    REASON FOR VISIT:  Followup for coronary artery disease.      I had the pleasure of seeing Mr. Aguilar at the [facility] in [place] this morning.  He is a very pleasant 52-year-old gentleman with known coronary artery disease and hyperlipidemia.  He suffered an inferior ST elevation MI in 02/2016.  Coronary angiogram at that time demonstrated an occluded large right posterolateral branch and no significant disease in his left coronary system.  He subsequently underwent successful PCI with drug-eluting stent placement in the right posterolateral branch and the mid RCA.      His echocardiogram post-MI showed preserved LV function with an EF of 55%-60% and no regional wall motion abnormality.  That echocardiogram also demonstrated mild ascending aortic dilatation.      Since his myocardial infarction, the patient has remained stable from a cardiac standpoint.  He is active and exercises on a daily basis.  He denies any exertional chest pain or shortness of breath.  His blood pressure is well controlled with the current medical program.  He was not on statin when he had his MI.  He was placed on atorvastatin 80 mg daily.  His LDL prior to his MI was 133.  After the initiation of atorvastatin, his LDL dropped to 69.  Unfortunately, he stopped taking atorvastatin.  He is not sure why this happened.  Nonetheless, a repeat lipid profile 2 months ago showed an LDL of 132 and total cholesterol of 223.     Outpatient Encounter Prescriptions as of 5/22/2017   Medication Sig Dispense Refill     atorvastatin (LIPITOR) 80 MG tablet Take 1 tablet (80 mg) by mouth At Bedtime 90 tablet 3     metoprolol (LOPRESSOR) 25 MG tablet Take 1 tablet (25 mg) by mouth 2 times daily 180 tablet 3     lisinopril (PRINIVIL/ZESTRIL) 5 MG tablet Take 1 tablet (5 mg) by mouth daily 90 tablet 3     sildenafil  (REVATIO/VIAGRA) 20 MG tablet Take 1-5 tablets ( mg) by mouth daily as needed Take 1 tablet (20 mg) by mouth three times daily for pulmonary hypertension.  Never use with nitroglycerin, terazosin or doxazosin. 30 tablet 0     nitroglycerin (NITROSTAT) 0.4 MG SL tablet Place 1 tablet (0.4 mg) under the tongue every 5 minutes as needed for chest pain if you are still having symptoms after 3 doses (15 minutes) call 911. 25 tablet 2     aspirin 81 MG tablet Take 1 tablet (81 mg) by mouth daily 30 tablet      sildenafil (VIAGRA) 50 MG tablet Take 0.5-2 tablets ( mg) by mouth daily as needed for erectile dysfunction Take 30 min to 4 hours before intercourse.  Never use with nitroglycerin, terazosin or doxazosin. 6 tablet 11     [DISCONTINUED] lisinopril (PRINIVIL/ZESTRIL) 5 MG tablet TAKE 1 TABLET BY MOUTH DAILY 30 tablet 11     [DISCONTINUED] atorvastatin (LIPITOR) 80 MG tablet Take 1 tablet (80 mg) by mouth At Bedtime 30 tablet 0     [DISCONTINUED] lisinopril (PRINIVIL/ZESTRIL) 5 MG tablet Take 1 tablet (5 mg) by mouth daily 30 tablet 0     [DISCONTINUED] metoprolol (LOPRESSOR) 25 MG tablet Take 1 tablet (25 mg) by mouth 2 times daily 60 tablet 0     [DISCONTINUED] ticagrelor (BRILINTA) 90 MG tablet Take 1 tablet (90 mg) by mouth 2 times daily 62 tablet 0     No facility-administered encounter medications on file as of 5/22/2017.       IMPRESSION, REPORT AND PLAN:   1.  Coronary artery disease.   2.  Status post prior inferior myocardial infarction.   3.  Status post PCI with drug-eluting stent placement in the mid RCA and right posterolateral branch.   4.  Hyperlipidemia.   5.  Hypertension.      Mr. Aguilar remains stable from a cardiopulmonary point of view.  He denies any exertional chest pain or shortness of breath.  His risk factors are well controlled with the exception of the hyperlipidemia.      I did review his laboratory studies that were performed a few months ago.  He is not sure why the  atorvastatin was dropped off from his medical program.  I recommended that he resumes atorvastatin.  I gave him a new prescription for 80 mg of Lipitor to be taken once a day.      Since he completed dual antiplatelet therapy for more than 12 months, I recommended that he discontinue the ticagrelor.  He will continue aspirin lifelong.      We will repeat another echocardiogram in approximately 1 year to follow up on the mild ascending aortic dilatation.  We will see him at that time for followup as well.        I appreciate the opportunity to be part of this patient's care.     Sincerely,    Merrick Plaza MD    [facility]

## 2018-06-12 DIAGNOSIS — I25.10 CORONARY ARTERY DISEASE INVOLVING NATIVE HEART WITHOUT ANGINA PECTORIS, UNSPECIFIED VESSEL OR LESION TYPE: ICD-10-CM

## 2018-06-12 DIAGNOSIS — I21.11 STEMI INVOLVING RIGHT CORONARY ARTERY (H): ICD-10-CM

## 2018-06-12 RX ORDER — LISINOPRIL 5 MG/1
5 TABLET ORAL DAILY
Qty: 90 TABLET | Refills: 0 | Status: SHIPPED | OUTPATIENT
Start: 2018-06-12 | End: 2018-08-21

## 2018-06-12 RX ORDER — METOPROLOL TARTRATE 25 MG/1
25 TABLET, FILM COATED ORAL 2 TIMES DAILY
Qty: 180 TABLET | Refills: 0 | Status: SHIPPED | OUTPATIENT
Start: 2018-06-12 | End: 2018-08-21

## 2018-06-12 RX ORDER — ATORVASTATIN CALCIUM 80 MG/1
80 TABLET, FILM COATED ORAL AT BEDTIME
Qty: 90 TABLET | Refills: 0 | Status: SHIPPED | OUTPATIENT
Start: 2018-06-12 | End: 2018-08-21

## 2018-06-28 ENCOUNTER — OFFICE VISIT (OUTPATIENT)
Dept: CARDIOLOGY | Facility: CLINIC | Age: 53
End: 2018-06-28
Attending: INTERNAL MEDICINE
Payer: COMMERCIAL

## 2018-06-28 ENCOUNTER — HOSPITAL ENCOUNTER (OUTPATIENT)
Dept: CARDIOLOGY | Facility: CLINIC | Age: 53
Discharge: HOME | End: 2018-06-28
Attending: INTERNAL MEDICINE | Admitting: INTERNAL MEDICINE
Payer: COMMERCIAL

## 2018-06-28 VITALS
HEIGHT: 69 IN | BODY MASS INDEX: 29.03 KG/M2 | HEART RATE: 60 BPM | SYSTOLIC BLOOD PRESSURE: 104 MMHG | WEIGHT: 196 LBS | DIASTOLIC BLOOD PRESSURE: 69 MMHG

## 2018-06-28 DIAGNOSIS — I77.810 ASCENDING AORTA DILATION (H): ICD-10-CM

## 2018-06-28 DIAGNOSIS — I25.10 CORONARY ARTERY DISEASE INVOLVING NATIVE CORONARY ARTERY OF NATIVE HEART WITHOUT ANGINA PECTORIS: Primary | ICD-10-CM

## 2018-06-28 DIAGNOSIS — I25.10 CORONARY ARTERY DISEASE INVOLVING NATIVE CORONARY ARTERY OF NATIVE HEART WITHOUT ANGINA PECTORIS: ICD-10-CM

## 2018-06-28 PROCEDURE — 40000264 ECHO COMPLETE WITH OPTISON

## 2018-06-28 PROCEDURE — 93306 TTE W/DOPPLER COMPLETE: CPT | Mod: 26 | Performed by: INTERNAL MEDICINE

## 2018-06-28 PROCEDURE — 99214 OFFICE O/P EST MOD 30 MIN: CPT | Performed by: INTERNAL MEDICINE

## 2018-06-28 PROCEDURE — 25500064 ZZH RX 255 OP 636: Performed by: INTERNAL MEDICINE

## 2018-06-28 RX ADMIN — HUMAN ALBUMIN MICROSPHERES AND PERFLUTREN 9 ML: 10; .22 INJECTION, SOLUTION INTRAVENOUS at 08:30

## 2018-06-28 NOTE — PROGRESS NOTES
HPI and Plan:   See dictation    No orders of the defined types were placed in this encounter.      No orders of the defined types were placed in this encounter.      There are no discontinued medications.      Encounter Diagnoses   Name Primary?     Coronary artery disease involving native coronary artery of native heart without angina pectoris Yes     Ascending aorta dilation (H)        CURRENT MEDICATIONS:  Current Outpatient Prescriptions   Medication Sig Dispense Refill     aspirin 81 MG tablet Take 1 tablet (81 mg) by mouth daily 30 tablet      atorvastatin (LIPITOR) 80 MG tablet Take 1 tablet (80 mg) by mouth At Bedtime 90 tablet 0     lisinopril (PRINIVIL/ZESTRIL) 5 MG tablet Take 1 tablet (5 mg) by mouth daily 90 tablet 0     metoprolol tartrate (LOPRESSOR) 25 MG tablet Take 1 tablet (25 mg) by mouth 2 times daily 180 tablet 0     nitroglycerin (NITROSTAT) 0.4 MG SL tablet Place 1 tablet (0.4 mg) under the tongue every 5 minutes as needed for chest pain if you are still having symptoms after 3 doses (15 minutes) call 911. 25 tablet 2     sildenafil (REVATIO/VIAGRA) 20 MG tablet Take 1-5 tablets ( mg) by mouth daily as needed Take 1 tablet (20 mg) by mouth three times daily for pulmonary hypertension.  Never use with nitroglycerin, terazosin or doxazosin. 30 tablet 0     sildenafil (VIAGRA) 50 MG tablet Take 0.5-2 tablets ( mg) by mouth daily as needed for erectile dysfunction Take 30 min to 4 hours before intercourse.  Never use with nitroglycerin, terazosin or doxazosin. 6 tablet 11       ALLERGIES   No Known Allergies    PAST MEDICAL HISTORY:  Past Medical History:   Diagnosis Date     ASCVD (arteriosclerotic cardiovascular disease)      ED (erectile dysfunction)      STEMI (ST elevation myocardial infarction) (H)        PAST SURGICAL HISTORY:  Past Surgical History:   Procedure Laterality Date     CARDIAC CATHERIZATION  2/13/16    Successful PCI with PAVITHRA placement in the rPLA/distal RCA, mid  "RCA       FAMILY HISTORY:  Family History   Problem Relation Age of Onset     Coronary Artery Disease Father 58     heart valve replacement     Myocardial Infarction Father      Diabetes Mother        SOCIAL HISTORY:  Social History     Social History     Marital status: Single     Spouse name: N/A     Number of children: N/A     Years of education: N/A     Social History Main Topics     Smoking status: Never Smoker     Smokeless tobacco: Never Used     Alcohol use 0.0 oz/week     0 Standard drinks or equivalent per week      Comment: beer or wine     Drug use: No     Sexual activity: Yes     Partners: Female     Birth control/ protection: Surgical     Other Topics Concern     Parent/Sibling W/ Cabg, Mi Or Angioplasty Before 65f 55m? No     Caffeine Concern Yes     coffee in the am     Sleep Concern No     since surgery been sleepy \"Ok\"     Stress Concern Yes     self employed     Exercise Yes     uses treadmill daily     Social History Narrative       Review of Systems:  Skin:  Negative       Eyes:  Positive for glasses    ENT:  Negative      Respiratory:  Negative       Cardiovascular:  Negative      Gastroenterology: Negative      Genitourinary:  Negative      Musculoskeletal:  Negative      Neurologic:  Negative      Psychiatric:  Negative      Heme/Lymph/Imm:  Negative      Endocrine:  Negative        Physical Exam:  Vitals: /69  Pulse 60  Ht 1.746 m (5' 8.75\")  Wt 88.9 kg (196 lb)  BMI 29.16 kg/m2    Constitutional:  cooperative;in no acute distress        Skin:  warm and dry to the touch;no apparent skin lesions or masses noted          Head:  normocephalic        Eyes:           Lymph:      ENT:  no pallor or cyanosis        Neck:  carotid pulses are full and equal bilaterally;JVP normal        Respiratory:  clear to auscultation         Cardiac: regular rhythm;normal S1 and S2;no murmurs, gallops or rubs detected     no presence of murmur          pulses full and equal                              "           GI:  abdomen soft;no masses;no bruits        Extremities and Muscular Skeletal:  no edema;normal muscle strength and tone              Neurological:  no gross motor deficits        Psych:           CC  Merrick Plaza MD  [address]  [place] [id number]

## 2018-06-28 NOTE — LETTER
6/28/2018      Montrell Sanchez MD  [address]  [facility] [address]      RE: Tip Aguilar       Dear Colleague,    I had the pleasure of seeing Tip Aguialr in the [facility].    Service Date: 06/28/2018      REASON FOR VISIT:  Followup for coronary artery disease.      HISTORY OF PRESENT ILLNESS:  I had the pleasure of seeing Tip Aguilar at the [facility] in [place] this morning.  He is a very pleasant 53-year-old gentleman with known coronary artery disease and hyperlipidemia.  He suffered an inferior ST elevation MI in 02/2016.  Coronary angiogram at that time demonstrated an occluded large right posterolateral branch and moderate severe mid RCA stenosis.  He had no significant disease elsewhere.  He subsequently underwent successful PCI with drug-eluting stent placement in the right posterolateral branch and the mid RCA.      He has done quite well from a cardiac point of view since then.  He temporarily stopped his atorvastatin after his stenting procedure but has resumed it this past year.  We do not have any recent lipid profile.      I have reviewed the patient's recent echocardiogram which was obtained today.  This demonstrated normal LV function and mild aortic root dilatation.      IMPRESSION, REPORT, PLAN:   1.  Coronary artery disease.   2.  Status post prior inferior myocardial infarction and stenting of the mid RCA and right posterolateral branch.   3.  Hyperlipidemia   4.  Hypertension.      Mr. Aguilar continues to do well from a cardiac point of view.  He currently denies any exertional chest pain or shortness of breath.  His risk factors are well controlled with the current medical program.      I reviewed his echocardiogram with him.  The echo shows stable normal LV function.      I recommended that he continue his current medical program.  We will see him in approximately 2 years for followup but sooner if he develops symptoms.       I appreciate the opportunity to be part of this patient's care.         SHRUTI PLAZA MD             D: 2018   T: 2018   MT: WAGNER      Name:     CLAIRE EMERSON   MRN:      [id number]        Account:      [id number]   :      1965           Service Date: 2018      Document: [id number]         Outpatient Encounter Prescriptions as of 2018   Medication Sig Dispense Refill     aspirin 81 MG tablet Take 1 tablet (81 mg) by mouth daily 30 tablet      atorvastatin (LIPITOR) 80 MG tablet Take 1 tablet (80 mg) by mouth At Bedtime 90 tablet 0     lisinopril (PRINIVIL/ZESTRIL) 5 MG tablet Take 1 tablet (5 mg) by mouth daily 90 tablet 0     metoprolol tartrate (LOPRESSOR) 25 MG tablet Take 1 tablet (25 mg) by mouth 2 times daily 180 tablet 0     nitroglycerin (NITROSTAT) 0.4 MG SL tablet Place 1 tablet (0.4 mg) under the tongue every 5 minutes as needed for chest pain if you are still having symptoms after 3 doses (15 minutes) call 911. 25 tablet 2     sildenafil (REVATIO/VIAGRA) 20 MG tablet Take 1-5 tablets ( mg) by mouth daily as needed Take 1 tablet (20 mg) by mouth three times daily for pulmonary hypertension.  Never use with nitroglycerin, terazosin or doxazosin. 30 tablet 0     sildenafil (VIAGRA) 50 MG tablet Take 0.5-2 tablets ( mg) by mouth daily as needed for erectile dysfunction Take 30 min to 4 hours before intercourse.  Never use with nitroglycerin, terazosin or doxazosin. 6 tablet 11     [DISCONTINUED] sodium chloride (PF) 0.9% PF flush 10 mL        No facility-administered encounter medications on file as of 2018.        Again, thank you for allowing me to participate in the care of your patient.      Sincerely,    Shruti Plaza MD, MD     [facility]

## 2018-06-28 NOTE — LETTER
6/28/2018    Montrell Sanchez MD  [address]  [facility] [address]    RE: Tip Larson        Dear Colleague,    I had the pleasure of seeing Tip Larson  in the [facility].    HPI and Plan:   See dictation    No orders of the defined types were placed in this encounter.      No orders of the defined types were placed in this encounter.      There are no discontinued medications.      Encounter Diagnoses   Name Primary?     Coronary artery disease involving native coronary artery of native heart without angina pectoris Yes     Ascending aorta dilation (H)        CURRENT MEDICATIONS:  Current Outpatient Prescriptions   Medication Sig Dispense Refill     aspirin 81 MG tablet Take 1 tablet (81 mg) by mouth daily 30 tablet      atorvastatin (LIPITOR) 80 MG tablet Take 1 tablet (80 mg) by mouth At Bedtime 90 tablet 0     lisinopril (PRINIVIL/ZESTRIL) 5 MG tablet Take 1 tablet (5 mg) by mouth daily 90 tablet 0     metoprolol tartrate (LOPRESSOR) 25 MG tablet Take 1 tablet (25 mg) by mouth 2 times daily 180 tablet 0     nitroglycerin (NITROSTAT) 0.4 MG SL tablet Place 1 tablet (0.4 mg) under the tongue every 5 minutes as needed for chest pain if you are still having symptoms after 3 doses (15 minutes) call 911. 25 tablet 2     sildenafil (REVATIO/VIAGRA) 20 MG tablet Take 1-5 tablets ( mg) by mouth daily as needed Take 1 tablet (20 mg) by mouth three times daily for pulmonary hypertension.  Never use with nitroglycerin, terazosin or doxazosin. 30 tablet 0     sildenafil (VIAGRA) 50 MG tablet Take 0.5-2 tablets ( mg) by mouth daily as needed for erectile dysfunction Take 30 min to 4 hours before intercourse.  Never use with nitroglycerin, terazosin or doxazosin. 6 tablet 11       ALLERGIES   No Known Allergies    PAST MEDICAL HISTORY:  Past Medical History:   Diagnosis Date     ASCVD (arteriosclerotic cardiovascular disease)      ED (erectile dysfunction)      STEMI (ST  "elevation myocardial infarction) (H)        PAST SURGICAL HISTORY:  Past Surgical History:   Procedure Laterality Date     CARDIAC CATHERIZATION  2/13/16    Successful PCI with PAVITHRA placement in the rPLA/distal RCA, mid RCA       FAMILY HISTORY:  Family History   Problem Relation Age of Onset     Coronary Artery Disease Father 58     heart valve replacement     Myocardial Infarction Father      Diabetes Mother        SOCIAL HISTORY:  Social History     Social History     Marital status: Single     Spouse name: N/A     Number of children: N/A     Years of education: N/A     Social History Main Topics     Smoking status: Never Smoker     Smokeless tobacco: Never Used     Alcohol use 0.0 oz/week     0 Standard drinks or equivalent per week      Comment: beer or wine     Drug use: No     Sexual activity: Yes     Partners: Female     Birth control/ protection: Surgical     Other Topics Concern     Parent/Sibling W/ Cabg, Mi Or Angioplasty Before 65f 55m? No     Caffeine Concern Yes     coffee in the am     Sleep Concern No     since surgery been sleepy \"Ok\"     Stress Concern Yes     self employed     Exercise Yes     uses treadmill daily     Social History Narrative       Review of Systems:  Skin:  Negative       Eyes:  Positive for glasses    ENT:  Negative      Respiratory:  Negative       Cardiovascular:  Negative      Gastroenterology: Negative      Genitourinary:  Negative      Musculoskeletal:  Negative      Neurologic:  Negative      Psychiatric:  Negative      Heme/Lymph/Imm:  Negative      Endocrine:  Negative        Physical Exam:  Vitals: /69  Pulse 60  Ht 1.746 m (5' 8.75\")  Wt 88.9 kg (196 lb)  BMI 29.16 kg/m2    Constitutional:  cooperative;in no acute distress        Skin:  warm and dry to the touch;no apparent skin lesions or masses noted          Head:  normocephalic        Eyes:           Lymph:      ENT:  no pallor or cyanosis        Neck:  carotid pulses are full and equal bilaterally;JVP " normal        Respiratory:  clear to auscultation         Cardiac: regular rhythm;normal S1 and S2;no murmurs, gallops or rubs detected     no presence of murmur          pulses full and equal                                        GI:  abdomen soft;no masses;no bruits        Extremities and Muscular Skeletal:  no edema;normal muscle strength and tone              Neurological:  no gross motor deficits        Psych:           CC  Merrick Plaza MD  [address]  [place] [address]                Thank you for allowing me to participate in the care of your patient.      Sincerely,     Merrick Plaza MD, MD     [facility]    cc:   Merrick Plaza MD  [address]  [place] [address]

## 2018-06-28 NOTE — PROGRESS NOTES
Service Date: 06/28/2018      REASON FOR VISIT:  Followup for coronary artery disease.      HISTORY OF PRESENT ILLNESS:  I had the pleasure of seeing Tip Emerson at the [facility] in [place] this morning.  He is a very pleasant 53-year-old gentleman with known coronary artery disease and hyperlipidemia.  He suffered an inferior ST elevation MI in 02/2016.  Coronary angiogram at that time demonstrated an occluded large right posterolateral branch and moderate severe mid RCA stenosis.  He had no significant disease elsewhere.  He subsequently underwent successful PCI with drug-eluting stent placement in the right posterolateral branch and the mid RCA.      He has done quite well from a cardiac point of view since then.  He temporarily stopped his atorvastatin after his stenting procedure but has resumed it this past year.  We do not have any recent lipid profile.      I have reviewed the patient's recent echocardiogram which was obtained today.  This demonstrated normal LV function and mild aortic root dilatation.      IMPRESSION, REPORT, PLAN:   1.  Coronary artery disease.   2.  Status post prior inferior myocardial infarction and stenting of the mid RCA and right posterolateral branch.   3.  Hyperlipidemia   4.  Hypertension.      Mr. Emerson continues to do well from a cardiac point of view.  He currently denies any exertional chest pain or shortness of breath.  His risk factors are well controlled with the current medical program.      I reviewed his echocardiogram with him.  The echo shows stable normal LV function.      I recommended that he continue his current medical program.  We will see him in approximately 2 years for followup but sooner if he develops symptoms.      I appreciate the opportunity to be part of this patient's care.         SHRUTI DE LA VEGA MD             D: 06/28/2018   T: 06/28/2018   MT: WAGNER      Name:     TIP EMERSON   MRN:      [id number]        Account:       [id number]   :      1965           Service Date: 2018      Document: [id number]

## 2018-06-28 NOTE — MR AVS SNAPSHOT
"              After Visit Summary   6/28/2018    Tip Aguilar    MRN: [id number]           Patient Information     Date Of Birth          1965        Visit Information        Provider Department      6/28/2018 11:15 AM Merrick Plaza MD [facility]        Today's Diagnoses     Coronary artery disease involving native coronary artery of native heart without angina pectoris    -  1    Ascending aorta dilation (H)           Follow-ups after your visit        Future tests that were ordered for you today     Open Future Orders        Priority Expected Expires Ordered    ECHO COMPLETE WITH OPTISON Routine 5/22/2018 9/30/2018 5/22/2017            Who to contact     If you have questions or need follow up information about today's clinic visit or your schedule please contact [facility] directly at [phone].  Normal or non-critical lab and imaging results will be communicated to you by MyChart, letter or phone within 4 business days after the clinic has received the results. If you do not hear from us within 7 days, please contact the clinic through MyChart or phone. If you have a critical or abnormal lab result, we will notify you by phone as soon as possible.  Submit refill requests through [employer] or call your pharmacy and they will forward the refill request to us. Please allow 3 business days for your refill to be completed.          Additional Information About Your Visit        Care EveryWhere ID     This is your Care EveryWhere ID. This could be used by other organizations to access your [facility] medical records  [id number]        Your Vitals Were     Pulse Height BMI (Body Mass Index)             60 1.746 m (5' 8.75\") 29.16 kg/m2          Blood Pressure from Last 3 Encounters:   06/28/18 104/69   05/22/17 116/72   03/21/17 100/70    Weight from Last 3 Encounters:   06/28/18 88.9 kg (196 lb)   05/22/17 88 kg (194 lb) "   03/21/17 84.8 kg (187 lb)              We Performed the Following     Follow-Up with Cardiologist        Primary Care Provider Office Phone # Fax #    Montrell Sanchez [phone] [address] [facility] [address]        Goals        General    Functional Patient will contact Cardiac Rehab while not able to play hockey and sign up for the rehab program (pt-stated)     Notes - Note created  3/3/2016  3:33 PM by Bailey Garcia RN    As of today's date 3/3/2016 goal is met at 0 - 25%.   Goal Status:  Active        Medication 1 Patient will adjust medications to match his schedule so that all of the medications will be taken on time.  (pt-stated)     Notes - Note created  3/3/2016  3:26 PM by Bailey Garcia RN    As of today's date 3/3/2016 goal is met at 0 - 25%.   Goal Status:  Active        Medication 1 Patient will carry his nitroglycerin with him wherever he goes and take as prescribed if needed (pt-stated)     Notes - Note created  3/3/2016  3:30 PM by Bailey Garcia RN    As of today's date 3/3/2016 goal is met at 0 - 25%.   Goal Status:  Active        Medication 3 Patient will refill the nitroglycerine every 6 months whether the bottle has been opended or not (pt-stated)     Notes - Note created  3/3/2016  3:32 PM by Bailey Garcia, RN    As of today's date 3/3/2016 goal is met at 0 - 25%.   Goal Status:  Active          Equal Access to Services     [facility]: Hadii josé ku hadasho Soorly, waaxda luqadaha, qaybta kaalmada santy niño . So [facility] [phone].    ATENCIÓN: Si habla español, tiene a lawrence disposición servicios gratuitos de asistencia lingüística. Llame al [phone].    We comply with applicable federal civil rights laws and [place] laws. We do not discriminate on the basis of race, color, national origin, age, disability, sex, sexual orientation, or gender identity.            Thank you!     Thank you for choosing  [facility]  for your care. Our goal is always to provide you with excellent care. Hearing back from our patients is one way we can continue to improve our services. Please take a few minutes to complete the written survey that you may receive in the mail after your visit with us. Thank you!             Your Updated Medication List - Protect others around you: Learn how to safely use, store and throw away your medicines at www.disposemymeds.org.          This list is accurate as of 6/28/18 11:42 AM.  Always use your most recent med list.                   Brand Name Dispense Instructions for use Diagnosis    aspirin 81 MG tablet     30 tablet    Take 1 tablet (81 mg) by mouth daily    STEMI involving right coronary artery (H)       atorvastatin 80 MG tablet    LIPITOR    90 tablet    Take 1 tablet (80 mg) by mouth At Bedtime    Coronary artery disease involving native heart without angina pectoris, unspecified vessel or lesion type       lisinopril 5 MG tablet    PRINIVIL/ZESTRIL    90 tablet    Take 1 tablet (5 mg) by mouth daily    Coronary artery disease involving native heart without angina pectoris, unspecified vessel or lesion type       metoprolol tartrate 25 MG tablet    LOPRESSOR    180 tablet    Take 1 tablet (25 mg) by mouth 2 times daily    Coronary artery disease involving native heart without angina pectoris, unspecified vessel or lesion type       nitroGLYcerin 0.4 MG sublingual tablet    NITROSTAT    25 tablet    Place 1 tablet (0.4 mg) under the tongue every 5 minutes as needed for chest pain if you are still having symptoms after 3 doses (15 minutes) call 911.    STEMI involving right coronary artery (H)       sildenafil 20 MG tablet    REVATIO    30 tablet    Take 1-5 tablets ( mg) by mouth daily as needed Take 1 tablet (20 mg) by mouth three times daily for pulmonary hypertension.  Never use with nitroglycerin, terazosin or doxazosin.    Erectile  dysfunction, unspecified erectile dysfunction type       sildenafil 50 MG tablet    VIAGRA    6 tablet    Take 0.5-2 tablets ( mg) by mouth daily as needed for erectile dysfunction Take 30 min to 4 hours before intercourse.  Never use with nitroglycerin, terazosin or doxazosin.    Other male erectile dysfunction

## 2018-08-21 ENCOUNTER — OFFICE VISIT (OUTPATIENT)
Dept: FAMILY MEDICINE | Facility: CLINIC | Age: 53
End: 2018-08-21
Payer: COMMERCIAL

## 2018-08-21 VITALS
BODY MASS INDEX: 30.12 KG/M2 | OXYGEN SATURATION: 97 % | HEART RATE: 57 BPM | WEIGHT: 202.5 LBS | RESPIRATION RATE: 16 BRPM | TEMPERATURE: 97.8 F | SYSTOLIC BLOOD PRESSURE: 116 MMHG | DIASTOLIC BLOOD PRESSURE: 76 MMHG

## 2018-08-21 DIAGNOSIS — Z12.11 SPECIAL SCREENING FOR MALIGNANT NEOPLASMS, COLON: ICD-10-CM

## 2018-08-21 DIAGNOSIS — N52.9 ERECTILE DYSFUNCTION, UNSPECIFIED ERECTILE DYSFUNCTION TYPE: Primary | ICD-10-CM

## 2018-08-21 DIAGNOSIS — I25.10 CORONARY ARTERY DISEASE INVOLVING NATIVE HEART WITHOUT ANGINA PECTORIS, UNSPECIFIED VESSEL OR LESION TYPE: ICD-10-CM

## 2018-08-21 PROCEDURE — 36415 COLL VENOUS BLD VENIPUNCTURE: CPT | Performed by: FAMILY MEDICINE

## 2018-08-21 PROCEDURE — 80061 LIPID PANEL: CPT | Performed by: FAMILY MEDICINE

## 2018-08-21 PROCEDURE — 99213 OFFICE O/P EST LOW 20 MIN: CPT | Performed by: FAMILY MEDICINE

## 2018-08-21 PROCEDURE — 80048 BASIC METABOLIC PNL TOTAL CA: CPT | Performed by: FAMILY MEDICINE

## 2018-08-21 RX ORDER — LISINOPRIL 5 MG/1
5 TABLET ORAL DAILY
Qty: 90 TABLET | Refills: 3 | Status: SHIPPED | OUTPATIENT
Start: 2018-08-21 | End: 2019-09-18

## 2018-08-21 RX ORDER — SILDENAFIL CITRATE 20 MG/1
TABLET ORAL
Qty: 40 TABLET | Refills: 11 | Status: SHIPPED | OUTPATIENT
Start: 2018-08-21 | End: 2019-09-18

## 2018-08-21 RX ORDER — NITROGLYCERIN 0.4 MG/1
0.4 TABLET SUBLINGUAL EVERY 5 MIN PRN
Qty: 25 TABLET | Refills: 0 | Status: SHIPPED | OUTPATIENT
Start: 2018-08-21 | End: 2019-09-18

## 2018-08-21 RX ORDER — ATORVASTATIN CALCIUM 80 MG/1
80 TABLET, FILM COATED ORAL AT BEDTIME
Qty: 90 TABLET | Refills: 3 | Status: SHIPPED | OUTPATIENT
Start: 2018-08-21 | End: 2019-09-30

## 2018-08-21 RX ORDER — METOPROLOL TARTRATE 25 MG/1
25 TABLET, FILM COATED ORAL 2 TIMES DAILY
Qty: 180 TABLET | Refills: 3 | Status: SHIPPED | OUTPATIENT
Start: 2018-08-21 | End: 2019-09-30

## 2018-08-21 NOTE — PROGRESS NOTES
SUBJECTIVE:   Tip Aguilar is a 53 year old male who presents to clinic today for the following health issues:       Medication Followup of Sildenafil     Taking Medication as prescribed: yes    Side Effects:  None    Medication Helping Symptoms:  yes     The patient has a history of cad uses ntg not at all. Needs lipids and bmp today.     No cv, neuro symptoms     OBJECTIVE: /76  Pulse 57  Temp 97.8  F (36.6  C) (Oral)  Resp 16  Wt 202 lb 8 oz (91.9 kg)  SpO2 97%  BMI 30.12 kg/m2 no apparent distress   Exam:  GENERAL APPEARANCE: healthy, alert and no distress  NECK: no adenopathy, no asymmetry, masses, or scars and thyroid normal to palpation  RESP: lungs clear to auscultation - no rales, rhonchi or wheezes  CV: regular rates and rhythm, normal S1 S2, no S3 or S4 and no murmur, click or rub -  ABDOMEN:  soft, nontender, no HSM or masses and bowel sounds normal      ICD-10-CM    1. Erectile dysfunction, unspecified erectile dysfunction type N52.9 sildenafil (REVATIO) 20 MG tablet   2. Coronary artery disease involving native heart without angina pectoris, unspecified vessel or lesion type I25.10 metoprolol tartrate (LOPRESSOR) 25 MG tablet     lisinopril (PRINIVIL/ZESTRIL) 5 MG tablet     atorvastatin (LIPITOR) 80 MG tablet     Lipid panel reflex to direct LDL Non-fasting     Basic metabolic panel     nitroGLYcerin (NITROSTAT) 0.4 MG sublingual tablet   3. Special screening for malignant neoplasms, colon Z12.11 GASTROENTEROLOGY ADULT REF PROCEDURE ONLY [facility] [phone]    doing well.  Refilled meds. Referred for colonoscopy.

## 2018-08-21 NOTE — MR AVS SNAPSHOT
After Visit Summary   8/21/2018    Tip Aguilar    MRN: [id number]           Patient Information     Date Of Birth          1965        Visit Information        Provider Department      8/21/2018 2:00 PM Montrell Sanchez MD [facility]        Today's Diagnoses     Special screening for malignant neoplasms, colon    -  1    Coronary artery disease involving native heart without angina pectoris, unspecified vessel or lesion type        STEMI involving right coronary artery (H)        Erectile dysfunction, unspecified erectile dysfunction type           Follow-ups after your visit        Additional Services     GASTROENTEROLOGY ADULT REF PROCEDURE ONLY [facility] [phone]       Last Lab Result: Creatinine (mg/dL)       Date                     Value                 03/21/2017               0.92             ----------  Body mass index is 30.12 kg/(m^2).     Needed:  No  Language:  English    Patient will be contacted to schedule procedure.     Please be aware that coverage of these services is subject to the terms and limitations of your health insurance plan.  Call member services at your health plan with any benefit or coverage questions.  Any procedures must be performed at a [facility] facility OR coordinated by your clinic's referral office.    Please bring the following with you to your appointment:    (1) Any X-Rays, CTs or MRIs which have been performed.  Contact the facility where they were done to arrange for  prior to your scheduled appointment.    (2) List of current medications   (3) This referral request   (4) Any documents/labs given to you for this referral                  Your next 10 appointments already scheduled     Aug 21, 2018  2:00 PM CDT   SHORT with Montrell Sanchez MD   [facility]    [address] [address] [phone]              Who to contact      If you have questions or need follow up information about today's clinic visit or your schedule please contact [facility] directly at [phone].  Normal or non-critical lab and imaging results will be communicated to you by MyChart, letter or phone within 4 business days after the clinic has received the results. If you do not hear from us within 7 days, please contact the clinic through MyChart or phone. If you have a critical or abnormal lab result, we will notify you by phone as soon as possible.  Submit refill requests through [employer] or call your pharmacy and they will forward the refill request to us. Please allow 3 business days for your refill to be completed.          Additional Information About Your Visit        Care EveryWhere ID     This is your Care EveryWhere ID. This could be used by other organizations to access your [facility] medical records  [id number]        Your Vitals Were     Pulse Temperature Respirations Pulse Oximetry BMI (Body Mass Index)       57 97.8  F (36.6  C) (Oral) 16 97% 30.12 kg/m2        Blood Pressure from Last 3 Encounters:   08/21/18 116/76   06/28/18 104/69   05/22/17 116/72    Weight from Last 3 Encounters:   08/21/18 202 lb 8 oz (91.9 kg)   06/28/18 196 lb (88.9 kg)   05/22/17 194 lb (88 kg)              We Performed the Following     Basic metabolic panel     GASTROENTEROLOGY ADULT REF PROCEDURE ONLY [facility] [phone]     Lipid panel reflex to direct LDL Non-fasting          Where to get your medicines      These medications were sent to [facility] - [place], [place] - [address]  [address], [place] [address]     Phone:  [phone]     atorvastatin 80 MG tablet    lisinopril 5 MG tablet    metoprolol tartrate 25 MG tablet    sildenafil 20 MG tablet         Some of these will need a paper prescription and others can be bought over the counter.  Ask your nurse if you have questions.     Bring a paper  prescription for each of these medications     nitroGLYcerin 0.4 MG sublingual tablet          Primary Care Provider Office Phone # Fax #    Montrell Nic Sanchez [phone]       [address] [address]        Goals        General    Functional Patient will contact Cardiac Rehab while not able to play hockey and sign up for the rehab program (pt-stated)     Notes - Note created  3/3/2016  3:33 PM by Bailey Garcia RN    As of today's date 3/3/2016 goal is met at 0 - 25%.   Goal Status:  Active        Medication 1 Patient will adjust medications to match his schedule so that all of the medications will be taken on time.  (pt-stated)     Notes - Note created  3/3/2016  3:26 PM by Bailey Garcia RN    As of today's date 3/3/2016 goal is met at 0 - 25%.   Goal Status:  Active        Medication 1 Patient will carry his nitroglycerin with him wherever he goes and take as prescribed if needed (pt-stated)     Notes - Note created  3/3/2016  3:30 PM by Bailey Garcia, RN    As of today's date 3/3/2016 goal is met at 0 - 25%.   Goal Status:  Active        Medication 3 Patient will refill the nitroglycerine every 6 months whether the bottle has been opended or not (pt-stated)     Notes - Note created  3/3/2016  3:32 PM by Bailey Garcia, RN    As of today's date 3/3/2016 goal is met at 0 - 25%.   Goal Status:  Active          Equal Access to Services     [facility]: Hadii aad ku hadasho Soomaali, waaxda luqadaha, qaybta kaalmada adeegyada, santy mayes . So [facility] [phone].    ATENCIÓN: Si jenniferla conchis, tiene a lawrence disposición servicios gratuitos de asistencia lingüística. Llame al [phone].    We comply with applicable federal civil rights laws and [place] laws. We do not discriminate on the basis of race, color, national origin, age, disability, sex, sexual orientation, or gender identity.            Thank you!     Thank you for choosing [facility]  for your care. Our goal is always to provide you with excellent care. Hearing back from our patients is one way we can continue to improve our services. Please take a few minutes to complete the written survey that you may receive in the mail after your visit with us. Thank you!             Your Updated Medication List - Protect others around you: Learn how to safely use, store and throw away your medicines at www.disposemymeds.org.          This list is accurate as of 8/21/18  1:43 PM.  Always use your most recent med list.                   Brand Name Dispense Instructions for use Diagnosis    aspirin 81 MG tablet     30 tablet    Take 1 tablet (81 mg) by mouth daily    STEMI involving right coronary artery (H)       atorvastatin 80 MG tablet    LIPITOR    90 tablet    Take 1 tablet (80 mg) by mouth At Bedtime    Coronary artery disease involving native heart without angina pectoris, unspecified vessel or lesion type       lisinopril 5 MG tablet    PRINIVIL/ZESTRIL    90 tablet    Take 1 tablet (5 mg) by mouth daily    Coronary artery disease involving native heart without angina pectoris, unspecified vessel or lesion type       metoprolol tartrate 25 MG tablet    LOPRESSOR    180 tablet    Take 1 tablet (25 mg) by mouth 2 times daily    Coronary artery disease involving native heart without angina pectoris, unspecified vessel or lesion type       nitroGLYcerin 0.4 MG sublingual tablet    NITROSTAT    25 tablet    Place 1 tablet (0.4 mg) under the tongue every 5 minutes as needed for chest pain if you are still having symptoms after 3 doses (15 minutes) call 911.    STEMI involving right coronary artery (H)       sildenafil 20 MG tablet    REVATIO    40 tablet    TAKE ONE TO FIVE TABLETS BY MOUTH DAILY AS NEEDED.   (DO NOT TAKE WITH NITROGLYCERIN, TERAZOSIN, OR DOXAZOSIN)    Erectile dysfunction, unspecified erectile dysfunction type

## 2018-08-21 NOTE — LETTER
August 22, 2018      Tip Aguilar  [address] [place] [address]        Dear ,    We are writing to inform you of your test results.    Tip, Electrolytes and kidney tests are normal. The blood sugar is ok for a nonfasting level. Total cholesterol and LDL (bad) cholesterol at goal. The triglycerides are ok for a nonfasting level. Lets recheck in a year. Call or send us a [employer] message if you have questions    Resulted Orders   Lipid panel reflex to direct LDL Non-fasting   Result Value Ref Range    Cholesterol 168 <200 mg/dL    Triglycerides 287 (H) <150 mg/dL      Comment:      Borderline high:  150-199 mg/dl  High:             200-499 mg/dl  Very high:       >499 mg/dl  Non Fasting      HDL Cholesterol 34 (L) >39 mg/dL    LDL Cholesterol Calculated 77 <100 mg/dL      Comment:      Desirable:       <100 mg/dl    Non HDL Cholesterol 134 (H) <130 mg/dL      Comment:      Above Desirable:  130-159 mg/dl  Borderline high:  160-189 mg/dl  High:             190-219 mg/dl  Very high:       >219 mg/dl     Basic metabolic panel   Result Value Ref Range    Sodium 139 133 - 144 mmol/L    Potassium 3.8 3.4 - 5.3 mmol/L    Chloride 103 94 - 109 mmol/L    Carbon Dioxide 27 20 - 32 mmol/L    Anion Gap 9 3 - 14 mmol/L    Glucose 141 (H) 70 - 99 mg/dL      Comment:      Non Fasting    Urea Nitrogen 14 7 - 30 mg/dL    Creatinine 0.81 0.66 - 1.25 mg/dL    GFR Estimate >90 >60 mL/min/1.7m2      Comment:      Non  GFR Calc    GFR Estimate If Black >90 >60 mL/min/1.7m2      Comment:       GFR Calc    Calcium 8.9 8.5 - 10.1 mg/dL       If you have any questions or concerns, please call the clinic at the number listed above.       Sincerely,        Montrell Sanchez MD/nr

## 2018-08-21 NOTE — NURSING NOTE
Walked Rx for Nitroglycerin over to [facility] pharmacy.      Scooter Escobar MA

## 2018-08-22 LAB
ANION GAP SERPL CALCULATED.3IONS-SCNC: 9 MMOL/L (ref 3–14)
BUN SERPL-MCNC: 14 MG/DL (ref 7–30)
CALCIUM SERPL-MCNC: 8.9 MG/DL (ref 8.5–10.1)
CHLORIDE SERPL-SCNC: 103 MMOL/L (ref 94–109)
CHOLEST SERPL-MCNC: 168 MG/DL
CO2 SERPL-SCNC: 27 MMOL/L (ref 20–32)
CREAT SERPL-MCNC: 0.81 MG/DL (ref 0.66–1.25)
GFR SERPL CREATININE-BSD FRML MDRD: >90 ML/MIN/1.7M2
GLUCOSE SERPL-MCNC: 141 MG/DL (ref 70–99)
HDLC SERPL-MCNC: 34 MG/DL
LDLC SERPL CALC-MCNC: 77 MG/DL
NONHDLC SERPL-MCNC: 134 MG/DL
POTASSIUM SERPL-SCNC: 3.8 MMOL/L (ref 3.4–5.3)
SODIUM SERPL-SCNC: 139 MMOL/L (ref 133–144)
TRIGL SERPL-MCNC: 287 MG/DL

## 2019-06-10 ENCOUNTER — HOSPITAL ENCOUNTER (OUTPATIENT)
Facility: CLINIC | Age: 54
Discharge: HOME | End: 2019-06-10
Attending: INTERNAL MEDICINE | Admitting: INTERNAL MEDICINE
Payer: COMMERCIAL

## 2019-06-10 VITALS
SYSTOLIC BLOOD PRESSURE: 115 MMHG | OXYGEN SATURATION: 97 % | WEIGHT: 190 LBS | BODY MASS INDEX: 27.2 KG/M2 | DIASTOLIC BLOOD PRESSURE: 76 MMHG | HEIGHT: 70 IN | HEART RATE: 53 BPM | RESPIRATION RATE: 16 BRPM

## 2019-06-10 LAB — COLONOSCOPY: NORMAL

## 2019-06-10 PROCEDURE — 45381 COLONOSCOPY SUBMUCOUS NJX: CPT | Performed by: INTERNAL MEDICINE

## 2019-06-10 PROCEDURE — 25000128 H RX IP 250 OP 636: Performed by: INTERNAL MEDICINE

## 2019-06-10 PROCEDURE — 45382 COLONOSCOPY W/CONTROL BLEED: CPT | Performed by: INTERNAL MEDICINE

## 2019-06-10 PROCEDURE — 45385 COLONOSCOPY W/LESION REMOVAL: CPT | Performed by: INTERNAL MEDICINE

## 2019-06-10 PROCEDURE — 27210585 ZZH DEVICE CLIP QUICK: Performed by: INTERNAL MEDICINE

## 2019-06-10 PROCEDURE — G0500 MOD SEDAT ENDO SERVICE >5YRS: HCPCS | Performed by: INTERNAL MEDICINE

## 2019-06-10 PROCEDURE — 99153 MOD SED SAME PHYS/QHP EA: CPT | Performed by: INTERNAL MEDICINE

## 2019-06-10 PROCEDURE — 88305 TISSUE EXAM BY PATHOLOGIST: CPT | Mod: 26 | Performed by: INTERNAL MEDICINE

## 2019-06-10 PROCEDURE — 88305 TISSUE EXAM BY PATHOLOGIST: CPT | Performed by: INTERNAL MEDICINE

## 2019-06-10 RX ORDER — ONDANSETRON 2 MG/ML
4 INJECTION INTRAMUSCULAR; INTRAVENOUS EVERY 6 HOURS PRN
Status: DISCONTINUED | OUTPATIENT
Start: 2019-06-10 | End: 2019-06-10 | Stop reason: HOSPADM

## 2019-06-10 RX ORDER — NALOXONE HYDROCHLORIDE 0.4 MG/ML
.1-.4 INJECTION, SOLUTION INTRAMUSCULAR; INTRAVENOUS; SUBCUTANEOUS
Status: DISCONTINUED | OUTPATIENT
Start: 2019-06-10 | End: 2019-06-10 | Stop reason: HOSPADM

## 2019-06-10 RX ORDER — FENTANYL CITRATE 50 UG/ML
INJECTION, SOLUTION INTRAMUSCULAR; INTRAVENOUS PRN
Status: DISCONTINUED | OUTPATIENT
Start: 2019-06-10 | End: 2019-06-10 | Stop reason: HOSPADM

## 2019-06-10 RX ORDER — FLUMAZENIL 0.1 MG/ML
0.2 INJECTION, SOLUTION INTRAVENOUS
Status: DISCONTINUED | OUTPATIENT
Start: 2019-06-10 | End: 2019-06-10 | Stop reason: HOSPADM

## 2019-06-10 RX ORDER — CYANOCOBALAMIN (VITAMIN B-12) 500 MCG
400 LOZENGE ORAL DAILY
COMMUNITY

## 2019-06-10 RX ORDER — ONDANSETRON 4 MG/1
4 TABLET, ORALLY DISINTEGRATING ORAL EVERY 6 HOURS PRN
Status: DISCONTINUED | OUTPATIENT
Start: 2019-06-10 | End: 2019-06-10 | Stop reason: HOSPADM

## 2019-06-10 ASSESSMENT — MIFFLIN-ST. JEOR: SCORE: 1718.08

## 2019-06-10 NOTE — H&P
Pre-Endoscopy History and Physical     Tip Aguilar MRN# [id number]   YOB: 1965 Age: 54 year old     Date of Procedure: 6/10/2019  Primary care provider: Montrell Sanchez  Type of Endoscopy: Colonoscopy with possible biopsy, possible polypectomy  Reason for Procedure: screen  Type of Anesthesia Anticipated: Conscious Sedation    HPI:    Tip is a 54 year old male who will be undergoing the above procedure.      A history and physical has been performed. The patient's medications and allergies have been reviewed. The risks and benefits of the procedure and the sedation options and risks were discussed with the patient.  All questions were answered and informed consent was obtained.      He denies a personal or family history of anesthesia complications or bleeding disorders.     Patient Active Problem List   Diagnosis     Coronary artery disease involving native heart without angina pectoris, unspecified vessel or lesion type     Health Care Home        Past Medical History:   Diagnosis Date     ASCVD (arteriosclerotic cardiovascular disease)      ED (erectile dysfunction)      STEMI (ST elevation myocardial infarction) (H)         Past Surgical History:   Procedure Laterality Date     CARDIAC CATHERIZATION  2/13/16    Successful PCI with PAVITHRA placement in the rPLA/distal RCA, mid RCA       Social History     Tobacco Use     Smoking status: Never Smoker     Smokeless tobacco: Never Used   Substance Use Topics     Alcohol use: Yes     Alcohol/week: 0.0 oz     Comment: beer or wine       Family History   Problem Relation Age of Onset     Coronary Artery Disease Father 58        heart valve replacement     Myocardial Infarction Father      Diabetes Mother        Prior to Admission medications    Medication Sig Start Date End Date Taking? Authorizing Provider   aspirin 81 MG tablet Take 1 tablet (81 mg) by mouth daily 2/15/16   Riana Del Rio MD   atorvastatin (LIPITOR) 80 MG tablet Take 1 tablet (80  "mg) by mouth At Bedtime 8/21/18   Montrell Sanchez MD   lisinopril (PRINIVIL/ZESTRIL) 5 MG tablet Take 1 tablet (5 mg) by mouth daily 8/21/18   Montrell Sanchez MD   metoprolol tartrate (LOPRESSOR) 25 MG tablet Take 1 tablet (25 mg) by mouth 2 times daily 8/21/18   Montrell Sanchez MD   nitroGLYcerin (NITROSTAT) 0.4 MG sublingual tablet Place 1 tablet (0.4 mg) under the tongue every 5 minutes as needed for chest pain if you are still having symptoms after 3 doses (15 minutes) call 911. 8/21/18   Montrell Sanchez MD   sildenafil (REVATIO) 20 MG tablet TAKE ONE TO FIVE TABLETS BY MOUTH DAILY AS NEEDED.   (DO NOT TAKE WITH NITROGLYCERIN, TERAZOSIN, OR DOXAZOSIN) 8/21/18   Montrell Sanchez MD       No Known Allergies     REVIEW OF SYSTEMS:   5 point ROS negative except as noted above in HPI, including Gen., Resp., CV, GI &  system review.    PHYSICAL EXAM:   There were no vitals taken for this visit. Estimated body mass index is 30.12 kg/m  as calculated from the following:    Height as of 6/28/18: 1.746 m (5' 8.75\").    Weight as of 8/21/18: 91.9 kg (202 lb 8 oz).   GENERAL APPEARANCE: alert, and oriented  MENTAL STATUS: alert  AIRWAY EXAM: Mallampatti Class I (visualization of the soft palate, fauces, uvula, anterior and posterior pillars)  RESP: lungs clear to auscultation - no rales, rhonchi or wheezes  CV: regular rates and rhythm  DIAGNOSTICS:    Not indicated    IMPRESSION   ASA Class 2 - Mild systemic disease    PLAN:   Plan for Colonoscopy with possible biopsy, possible polypectomy. We discussed the risks, benefits and alternatives and the patient wished to proceed.    The above has been forwarded to the consulting provider.      Signed Electronically by: David Ramirez  Mamta 10, 2019          "

## 2019-06-10 NOTE — LETTER
May 17, 2019      Tip Aguilar  [address] [place] [address]         Thank you for choosing [facility]. You are scheduled for the following service:     Date:  Monday Mamta 10, 2019             Procedure:  COLONOSCOPY  Doctor:        Dr Ramirez   Arrival Time:  1pm  *Check in at Emergency/Endoscopy desk*  Procedure Time:  130pm      Location:   [facility]        Endoscopy Department, First Floor (Enter through ER Doors) *        [address] [place], [place] [address]      [phone] or [phone] ([facility]) to reschedule      MIRALAX -GATORADE  PREP  Colonoscopy is the most accurate test to detect colon polyps and colon cancer; and the only test where polyps can be removed. During this procedure, a doctor examines the lining of your large intestine and rectum through a flexible tube.   Transportation  Arrange for a ride for the day of your procedure with a responsible adult.  A taxi ride is not an option unless you are accompanied by a responsible adult. If you fail to arrange transportation with a responsible adult, your procedure will be cancelled and rescheduled.    Purchase the  following supplies at your local pharmacy:  - 2 (two) bisacodyl tablets: each tablet contains 5 mg.  (Dulcolax  laxative NOT Dulcolax  stool softener)   - 1 (one) 8.3 oz bottle of Polyethylene Glycol (PEG) 3350 Powder   (MiraLAX , Smooth LAX , ClearLAX  or equivalent)  - 64 oz Gatorade    Regular Gatorade, Gatorade G2 , Powerade , Powerade Zero  or Pedialyte  is acceptable. Red colored flavors are not allowed; all other colors (yellow, green, orange, purple and blue) are okay. It is also okay to buy two 2.12 oz packets of powdered Gatorade that can be mixed with water to a total volume of 64 oz of liquid.  - 1 (one) 10 oz bottle of Magnesium Citrate (Red colored flavors are not allowed)  It is also okay for you to use a 0.5 oz package of powdered magnesium  citrate (17 g) mixed with 10 oz of water.      PREPARATION FOR COLONOSCOPY    7 days before:    Discontinue fiber supplements and medications containing iron. This includes Metamucil  and Fibercon ; and multivitamins with iron.    3 days before:    Begin a low-fiber diet. A low-fiber diet helps making the cleanout more effective.     Examples of a low-fiber diet include (but are not limited to): white bread, white rice, pasta, crackers, fish, chicken, eggs, ground beef, creamy peanut butter, cooked/steamed/boiled vegetables, canned fruit, bananas, melons, milk, plain yogurt cheese, salad dressing and other condiments.     The following are not allowed on a low-fiber diet: seeds, nuts, popcorn, bran, whole wheat, corn, quinoa, raw fruits and vegetables, berries and dried fruit, beans and lentils.    For additional details on low-fiber diet, please refer to the table on the last page.    2 days before:    Continue the low-fiber diet.     Drink at least 8 glasses of water throughout the day.     Stop eating solid foods at 11:45 pm.    1 day before:    In the morning: begin a clear liquid diet (liquids you can see through).     Examples of a clear liquid diet include: water, clear broth or bouillon, Gatorade, Pedialyte or Powerade, carbonated and non-carbonated soft drinks (Sprite , 7-Up , ginger ale), strained fruit juices without pulp (apple, white grape, white cranberry), Jell-O  and popsicles.     The following are not allowed on a clear liquid diet: red liquids, alcoholic beverages, dairy products (milk, creamer, and yogurt), protein shakes, creamy broths, juice with pulp and chewing tobacco.    At noon: take 2 (two) bisacodyl tablets     At 4 (and no later than 6pm): start drinking the Miralax-Gatorade preparation (8.3 oz of Miralax mixed with 64 oz of Gatorade in a large pitcher). Drink 1(one) 8 oz glass every 15 minutes thereafter, until the mixture is gone.    COLON CLEANSING TIPS: drink adequate amounts of  fluids before and after your colon cleansing to prevent dehydration. Stay near a toilet because you will have diarrhea. Even if you are sitting on the toilet, continue to drink the cleansing solution every 15 minutes. If you feel nauseous or vomit, rinse your mouth with water, take a 15 to 30-minute-break and then continue drinking the solution. You will be uncomfortable until the stool has flushed from your colon (in about 2 to 4 hours). You may feel chilled.    Day of your procedure  You may take all of your morning medications including blood pressure medications, blood thinners (if you have not been instructed to stop these by our office), methadone, anti-seizure medications with sips of water 3 hours prior to your procedure or earlier. Do not take insulin or vitamins prior to your procedure. Continue the clear liquid diet.       4 hours prior: drink 10 oz of magnesium citrate. It may be easier to drink it with a straw.    STOP consuming all liquids after that.     Do not take anything by mouth during this time.     Allow extra time to travel to your procedure as you may need to stop and use a restroom along the way.    You are ready for the procedure, if you followed all instructions and your stool is no longer formed, but clear or yellow liquid. If you are unsure whether your colon is clean, please call our office at [phone] before you leave for your appointment.    Bring the following to your procedure:  - Insurance Card/Photo ID.   - List of current medications including over-the-counter medications and supplements.   - Your rescue inhaler if you currently use one to control asthma.      Canceling or rescheduling your appointment:   If you must cancel or reschedule your appointment, please call [phone] as soon as possible.      COLONOSCOPY PRE-PROCEDURE CHECKLIST    If you have diabetes, ask your regular doctor for diet and medication restrictions.  If you take an anticoagulant or anti-platelet  medication (such as Coumadin , Lovenox , Pradaxa , Xarelto , Eliquis , etc.), please call your primary doctor for advice on holding this medication.  If you take aspirin you may continue to do so.  If you are or may be pregnant, please discuss the risks and benefits of this procedure with your doctor.        What happens during a colonoscopy?    Plan to spend up to two hours, starting at registration time, at the endoscopy center the day of your procedure. The colonoscopy takes an average of 15 to 30 minutes. Recovery time is about 30 minutes.      Before the exam:    You will change into a gown.    Your medical history and medication list will be reviewed with you, unless that has been done over the phone prior to the procedure.     A nurse will insert an intravenous (IV) line into your hand or arm.    The doctor will meet with you and will give you a consent form to sign.  During the exam:     Medicine will be given through the IV line to help you relax.     Your heart rate and oxygen levels will be monitored. If your blood pressure is low, you may be given fluids through the IV line.     The doctor will insert a flexible hollow tube, called a colonoscope, into your rectum. The scope will be advanced slowly through the large intestine (colon).    You may have a feeling of fullness or pressure.     If an abnormal tissue or a polyp is found, the doctor may remove it through the endoscope for closer examination, or biopsy. Tissue removal is painless    After the exam:           Any tissue samples removed during the exam will be sent to a lab for evaluation. It may take 5-7 working days for you to be notified of the results.     A nurse will provide you with complete discharge instructions before you leave the endoscopy center. Be sure to ask the nurse for specific instructions if you take blood thinners such as Aspirin, Coumadin or Plavix.     The doctor will prepare a full report for you and for the physician who  referred you for the procedure.     Your doctor will talk with you about the initial results of your exam.      Medication given during the exam will prohibit you from driving for the rest of the day.     Following the exam, you may resume your normal diet. Your first meal should be light, no greasy foods. Avoid alcohol until the next day.     You may resume your regular activities the day after the procedure.         LOW-FIBER DIET    Foods RECOMMENDED Foods to AVOID   Breads, Cereal, Rice and Pasta:   White bread, rolls, biscuits, croissant and wes toast.   Waffles, Citizen of the [place] toast and pancakes.   White rice, noodles, pasta, macaroni and peeled cooked potatoes.   Plain crackers and saltines.   Cooked cereals: farina, cream of rice.   Cold cereals: Puffed Rice , Rice Krispies , Corn Flakes  and Special K    Breads, Cereal, Rice and Pasta:   Breads or rolls with nuts, seeds or fruit.   Whole wheat, pumpernickel, rye breads and cornbread.   Potatoes with skin, brown or wild rice, and kasha (buckwheat).     Vegetables:   Tender cooked and canned vegetables without seeds: carrots, asparagus tips, green or wax beans, pumpkin, spinach, lima beans. Vegetables:   Raw or steamed vegetables.   Vegetables with seeds.   Sauerkraut.   Winter squash, peas, broccoli, Brussel sprouts, cabbage, onions, cauliflower, baked beans, peas and corn.   Fruits:   Strained fruit juice.   Canned fruit, except pineapple.   Ripe bananas and melon. Fruits:   Prunes and prune juice.   Raw fruits.   Dried fruits: figs, dates and raisins.   Milk/Dairy:   Milk: plain or flavored.   Yogurt, custard and ice cream.   Cheese and cottage cheese Milk/Dairy:     Meat and other proteins:   ground, well-cooked tender beef, lamb, ham, veal, pork, fish, poultry and organ meats.   Eggs.   Peanut butter without nuts. Meat and other proteins:   Tough, fibrous meats with gristle.   Dry beans, peas and lentils.   Peanut butter with nuts.   Tofu.   Fats, Snack,  Sweets, Condiments and Beverages:   Margarine, butter, oils, mayonnaise, sour cream and salad dressing, plain gravy.   Sugar, hard candy, clear jelly, honey and syrup.   Spices, cooked herbs, bouillon, broth and soups made with allowed vegetable, ketchup and mustard.   Coffee, tea and carbonated drinks.   Plain cakes, cookies and pretzels.   Gelatin, plain puddings, custard, ice cream, sherbet and popsicles. Fats, Snack, Sweets, Condiments and Beverages:   Nuts, seeds and coconut.   Jam, marmalade and preserves.   Pickles, olives, relish and horseradish.   All desserts containing nuts, seeds, dried fruit and coconut; or made from whole grains or bran.   Candy made with nuts or seeds.   Popcorn.                     DIRECTIONS TO THE ENDOSCOPY DEPARTMENT     From the north ([facility])  Take [address] South, exit on [address] [address]. Get into the left hand ladan, turn left (east), go one-half mile to [address] and turn left. Go north to the first stoplight, take a right on [address] and follow it to the Emergency entrance.    From the south ([facility])  Take [address]N to the [address]E split and exit on [address] [address]. On [address] [address], turn left (west) to [address]. Turn right (north) on [address]. Go north to the first stoplight, take a right on [address] and follow it to the Emergency entrance.    From the east via 35E ([facility])  Take [address] south to [address] [address] exit. Turn right on [address] [address]. Go west to [address]. Turn right (north) on [address]. Go to the first stoplight, take a right and follow on [address] to the Emergency entrance.    From the east via [address] ([facility])  Take [address] to [address]. Turn left (south) on [address] to [address]. Turn left (east) on [address] and follow it to the Emergency entrance.    From the west via [address] ([place])  Take [address] east to [address]. Turn right (south) on [address] to [facility]. Turn left (east) on [facility] and follow it to the Emergency entrance.

## 2019-06-10 NOTE — DISCHARGE INSTRUCTIONS

## 2019-06-12 LAB — COPATH REPORT: NORMAL

## 2019-07-01 ENCOUNTER — OFFICE VISIT (OUTPATIENT)
Dept: PODIATRY | Facility: CLINIC | Age: 54
End: 2019-07-01
Payer: COMMERCIAL

## 2019-07-01 VITALS
WEIGHT: 190 LBS | HEIGHT: 70 IN | HEART RATE: 61 BPM | DIASTOLIC BLOOD PRESSURE: 79 MMHG | BODY MASS INDEX: 27.2 KG/M2 | SYSTOLIC BLOOD PRESSURE: 116 MMHG

## 2019-07-01 DIAGNOSIS — M10.9 ACUTE GOUT OF RIGHT FOOT, UNSPECIFIED CAUSE: Primary | ICD-10-CM

## 2019-07-01 DIAGNOSIS — M92.60 HAGLUND'S DEFORMITY: ICD-10-CM

## 2019-07-01 LAB
CRP SERPL-MCNC: 3.6 MG/L (ref 0–8)
ERYTHROCYTE [SEDIMENTATION RATE] IN BLOOD BY WESTERGREN METHOD: 9 MM/H (ref 0–20)

## 2019-07-01 PROCEDURE — 86140 C-REACTIVE PROTEIN: CPT | Performed by: PODIATRIST

## 2019-07-01 PROCEDURE — 36415 COLL VENOUS BLD VENIPUNCTURE: CPT | Performed by: PODIATRIST

## 2019-07-01 PROCEDURE — 84550 ASSAY OF BLOOD/URIC ACID: CPT | Performed by: PODIATRIST

## 2019-07-01 PROCEDURE — 99204 OFFICE O/P NEW MOD 45 MIN: CPT | Performed by: PODIATRIST

## 2019-07-01 PROCEDURE — 85652 RBC SED RATE AUTOMATED: CPT | Performed by: PODIATRIST

## 2019-07-01 RX ORDER — METHYLPREDNISOLONE 4 MG
TABLET, DOSE PACK ORAL
Qty: 21 TABLET | Refills: 0 | Status: SHIPPED | OUTPATIENT
Start: 2019-07-01 | End: 2020-09-15

## 2019-07-01 ASSESSMENT — MIFFLIN-ST. JEOR: SCORE: 1718.08

## 2019-07-01 NOTE — PATIENT INSTRUCTIONS
Thank you for choosing [facility]!    Follow up as needed    DR. GORE'S CLINIC LOCATIONS     MONDAY  [place] TUESDAY & FRIDAY AM  [place]   [address] [facility]   [address]   [place] [address] [place] [address]   [phone]  [phone]  [phone]       WEDNESDAY  [facility] [place] SCHEDULE SURGERY: [phone]   [address] [facility] APPOINTMENTS: [phone]   [place] [address] BILLING QUESTIONS: [phone]   [phone]         GOUT  Gout is a disorder that results from the build-up of uric acid in the tissues or a joint. It most often affects the joint of the big toe.  CAUSES  Gout attacks are caused by deposits of crystallized uric acid in the joint. Uric acid is present in the blood and eliminated in the urine, but in people who have gout, uric acid accumulates and crystallizes in the joints. Uric acid is the result of the breakdown of purines, chemicals that are found naturally in our bodies and in food. Some people develop gout because their kidneys have difficulty eliminating normal amounts of uric acid, while others produce too much uric acid.  Gout occurs most commonly in the big toe because uric acid is sensitive to temperature changes. At cooler temperatures, uric acid turns into crystals. Since the toe is the part of the body that is farthest from the heart, it s also the coolest part of the body - and, thus, the most likely target of gout. However, gout can affect any joint in the body.  The tendency to accumulate uric acid is often inherited. Other factors that put a person at risk for developing gout include: high blood pressure, diabetes, obesity, surgery, chemotherapy, stress, and certain medications and vitamins. For example, the body s ability to remove uric acid can be negatively affected by taking aspirin, some diuretic medications ( water pills ), and the vitamin niacin (also called  nicotinic acid). While gout is more common in men aged 40 to 60 years, it can occur in younger men as well as in women.  Consuming foods and beverages that contain high levels of purines can trigger an attack of gout. Some foods contain more purines than others and have been associated with an increase of uric acid, which leads to gout. You may be able to reduce your chances of getting a gout attack by limiting or avoiding shellfish, organ meats (kidney, liver, etc.), red wine, beer, and red meat.  Other Triggers include but are not limited to:  Congestive heart failure, deep vein thrombosis, pneumonia, fasting, dehydration, trauma/surgery, psoriasis.  SYMTOMS  An attack of gout can be miserable, marked by the following symptoms:  Intense pain that comes on suddenly - often in the middle of the night or upon arising   Signs of inflammation such as redness, swelling, and warmth over the joint.   DIAGNOSIS  To diagnose gout, the foot and ankle surgeon will ask questions about your personal and family medical history, followed by an examination of the affected joint. Laboratory tests and x-rays are sometimes ordered to determine if the inflammation is caused by something other than gout.  TREATMENT  Initial treatment of an attack of gout typically includes the following:  Medications. Prescription medications or injections are used to treat the pain, swelling, and inflammation.   Dietary restrictions. Foods and beverages that are high in purines should be avoided, since purines are converted in the body to uric acid.   Fluids. Drink plenty of water and other fluids each day, while also avoiding alcoholic beverages, which cause dehydration. Cherry Juice works well to help decrease pain.  Immobilize and elevate the foot. Avoid standing and walking to give your foot a rest. Also, elevate your foot (level with or slightly above the heart) to help reduce swelling.   The symptoms of gout and the inflammatory process usually  resolve in three to ten days with treatment. If gout symptoms continue despite the initial treatment, or if repeated attacks occur, see your primary care physician for maintenance treatment that may involve daily medication. In cases of repeated episodes, the underlying problem must be addressed, as the build-up of uric acid over time can cause arthritic damage to the joint.  DIET CHANGE  A gout diet reduces your intake of foods that are high in purines, which helps control your body's production of uric acid. If you're overweight or obese, lose weight. However, avoid fasting and rapid weight loss because these can promote a gout attack. Drink plenty of fluids to help flush uric acid from your body. Also avoid high-protein diets, which can cause you to produce too much uric acid (hyperuricemia).   To follow the diet:   Limit meat, poultry and fish. Animal proteins are high in purine. Avoid or severely limit high-purine foods, such as organ meats, herring, anchovies and mackerel. Red meat (beef, pork and lamb), fatty fish and seafood (tuna, shrimp, lobster and scallops) are associated with increased risk of gout. Because all meat, poultry and fish contain purines, limit your intake to 4 to 6 ounces (113 to 170 grams) daily.   Eat more plant-based proteins. You can increase your protein by including more plant-based sources, such as beans and legumes. This switch will also help you cut down on saturated fats, which may indirectly contribute to obesity and gout.   Limit or avoid alcohol. Alcohol interferes with the elimination of uric acid from your body. Drinking beer, in particular, has been linked to gout attacks. If you're having an attack, avoid alcohol. However, when you're not having an attack, drinking one or two 5-ounce (148 milliliter) servings a day of wine is not likely to increase your risk.   Drink plenty of fluids, particularly water. Fluids can help remove uric acid from your body. Aim for eight to 16  8-ounce (237 milliliter) glasses a day.   Choose low-fat or fat-free dairy products. Some studies have shown that drinking skim or low-fat milk and eating foods made with them, such as yogurt, help reduce the risk of gout. Aim for adequate dairy intake of 16 to 24 fluid ounces (473 to 710 milliliters) daily.   Choose complex carbohydrates. Eat more whole grains and fruits and vegetables and fewer refined carbohydrates, such as white bread, cakes and candy.   Limit or avoid sugar. Too many sweets can leave you with no room for plant-based proteins and low-fat or fat-free dairy products -- the foods you need to avoid gout. Sugary foods also tend to be high in calories, so they make it easier to eat more than you're likely to burn off. Although there's debate about whether sugar has a direct effect on uric acid levels, sweets are definitely linked to overweight and obesity.   There's also some evidence that drinking four to six cups of coffee a day lowers gout risk in men.   RESULTS  Following a gout diet can help you limit your body's uric acid production and increase its elimination. It's not likely to lower the uric acid concentration in your blood enough to treat your gout without medication, but it may help decrease the number of attacks and limit their severity. Following the gout diet and limiting your calories -- particularly if you also add in moderate daily exercise, such as brisk walking -- also can improve your overall health by helping you achieve and maintain a healthy weight.           BUCK'S DEFORMITY  Buck s deformity is a bony enlargement on the back of the heel. The soft tissue near the Achilles tendon becomes irritated when the bony enlargement rubs against shoes. This often leads to painful bursitis, which is an inflammation of the bursa (a fluid-filled sac between the tendon and bone).  CAUSES  Buck s deformity is often called  pump bump  because the rigid backs of pump-style shoes can  create pressure that aggravates the enlargement when walking. In fact, any shoes with a rigid back, such as ice skates, men s dress shoes or women s pumps, can cause this irritation.  To some extent, heredity plays a role in Buck s deformity. Inherited foot structures that can make one prone to developing this condition include:  A high-arched foot   A tight Achilles tendon   A tendency to walk on the outside of the heel.  SYMPTOMS  Buck s deformity can occur in one or both feet. The symptoms include:  A noticeable bump on the back of the heel   Pain in the area where the Achilles tendon attaches to the heel   Swelling in the back of the heel   Redness near the inflamed tissue  DIAGNOSIS  After evaluating the patient s symptoms, the foot and ankle surgeon will examine the foot. In addition, x-rays will be ordered to help the surgeon evaluate the structure of the heel bone.  TREATMENT OPTIONS  Nonsurgical treatment of Buck s deformity is aimed at reducing the inflammation of the bursa. While these approaches can resolve the pain and inflammation, they will not shrink the bony protrusion. Nonsurgical treatment can include one or more of the following:  Medication. Oral nonsteroidal anti-inflammatory drugs (NSAIDs), such as ibuprofen, may be recommended to reduce the pain and inflammation. Ice. To reduce swelling, apply an ice pack to the inflamed area, placing a thin towel between the ice and the skin. Use ice for 20 minutes and then wait at least 40 minutes before icing again.   Exercises. Stretching exercises help relieve tension from the Achilles tendon. These exercises are especially important for the patient who has a tight heel cord.   Heel lifts. Patients with high arches may find that heel lifts placed inside the shoe decrease the pressure on the heel.   Heel pads. Pads placed inside the shoe cushion the heel and may help reduce irritation when walking.   Shoe modification. Backless or soft backed  shoes help avoid or minimize irritation.   Physical therapy. Physical therapy modalities, such as ultrasound, can help to reduce inflammation.   Orthotic devices. Custom arch supports control the motion in the foot.   Immobilization. In some cases, casting may be necessary.  SURGERY  If nonsurgical treatment fails to provide adequate pain relief, surgery may be needed. The foot and ankle surgeon will determine the procedure that is best suited to your case. It is important to follow the surgeon s instructions for postsurgical care.  PREVENTION  To help prevent a recurrence of Buck s deformity:  wear appropriate shoes; avoid shoes with a rigid heel back   use arch supports or orthotic devices   perform stretching exercises to prevent the Achilles tendon from tightening   avoid running on hard surfaces and running uphill        BODY WEIGHT AND YOUR FEET  The following information is included in the after visit summary for all patients. Body weight can be a sensitive issue to discuss in clinic, but we think the following information is very important. Although we focus on the feet and ankles, we do support the overall health of our patients.     Many things can cause foot and ankle problems. Foot structure, activity level, foot mechanics and injuries are common causes of pain. One very important issue that often goes unmentioned, is body weight. Extra weight can cause increased stress on muscles, ligaments, bones and tendons. Sometimes just a few extra pounds is all it takes to put one over her/his threshold. Without reducing that stress, it can be difficult to alleviate pain. As Foot & Ankle specialists, our job is addressing the lower extremity problem and possible causes. Regarding extra body weight, we encourage patients to discuss diet and weight management plans with their primary care doctors. It is this team approach that gives you the best opportunity for pain relief and getting you back on your feet.       [facility] has a Comprehensive Weight Management Program. This program includes counseling, education, non-surgical and surgical approaches to weight loss. If you are interested in learning more either talk to you primary care provider or call [phone].

## 2019-07-01 NOTE — PROGRESS NOTES
PATIENT HISTORY:  Tip Aguilar is a 54 year old male who presents to clinic for R foot swelling, pain.  3 week duration.  Began in the 1st MPJ area.  Has moved to posterior heel area.  Worse with walking, standing.  Ibuprofen can help.  4-10/10 pain.  No injury.      Review of Systems:  Patient denies fever, chills, rash, wound, stiffness, limping, numbness, weakness, heart burn, blood in stool, chest pain with activity, calf pain when walking, shortness of breath with activity, chronic cough, easy bleeding/bruising, excessive thirst, fatigue, depression, anxiety.  Patient admits to swelling of ankles.     PAST MEDICAL HISTORY:   Past Medical History:   Diagnosis Date     ASCVD (arteriosclerotic cardiovascular disease)      ED (erectile dysfunction)      STEMI (ST elevation myocardial infarction) (H)         PAST SURGICAL HISTORY:   Past Surgical History:   Procedure Laterality Date     CARDIAC CATHERIZATION  2/13/16    Successful PCI with PAVITHRA placement in the rPLA/distal RCA, mid RCA     VASCULAR SURGERY      Stent replace (coronary)        MEDICATIONS:   Current Outpatient Medications:      methylPREDNISolone (MEDROL DOSEPAK) 4 MG tablet therapy pack, Follow Package Directions, Disp: 21 tablet, Rfl: 0     aspirin 81 MG tablet, Take 1 tablet (81 mg) by mouth daily, Disp: 30 tablet, Rfl:      atorvastatin (LIPITOR) 80 MG tablet, Take 1 tablet (80 mg) by mouth At Bedtime, Disp: 90 tablet, Rfl: 3     Bioflavonoid Products (VITAMIN C) CHEW, , Disp: , Rfl:      lisinopril (PRINIVIL/ZESTRIL) 5 MG tablet, Take 1 tablet (5 mg) by mouth daily, Disp: 90 tablet, Rfl: 3     metoprolol tartrate (LOPRESSOR) 25 MG tablet, Take 1 tablet (25 mg) by mouth 2 times daily, Disp: 180 tablet, Rfl: 3     nitroGLYcerin (NITROSTAT) 0.4 MG sublingual tablet, Place 1 tablet (0.4 mg) under the tongue every 5 minutes as needed for chest pain if you are still having symptoms after 3 doses (15 minutes) call 911., Disp: 25 tablet, Rfl: 0      sildenafil (REVATIO) 20 MG tablet, TAKE ONE TO FIVE TABLETS BY MOUTH DAILY AS NEEDED.   (DO NOT TAKE WITH NITROGLYCERIN, TERAZOSIN, OR DOXAZOSIN), Disp: 40 tablet, Rfl: 11     vitamin E 400 units TABS, Take 400 Units by mouth daily, Disp: , Rfl:      ALLERGIES:  No Known Allergies     SOCIAL HISTORY:   Social History     Socioeconomic History     Marital status: Single     Spouse name: Not on file     Number of children: Not on file     Years of education: Not on file     Highest education level: Not on file   Occupational History     Not on file   Social Needs     Financial resource strain: Not on file     Food insecurity:     Worry: Not on file     Inability: Not on file     Transportation needs:     Medical: Not on file     Non-medical: Not on file   Tobacco Use     Smoking status: Never Smoker     Smokeless tobacco: Never Used   Substance and Sexual Activity     Alcohol use: Yes     Alcohol/week: 0.0 oz     Comment: beer or wine  6-10 per week     Drug use: No     Sexual activity: Yes     Partners: Female     Birth control/protection: Surgical   Lifestyle     Physical activity:     Days per week: Not on file     Minutes per session: Not on file     Stress: Not on file   Relationships     Social connections:     Talks on phone: Not on file     Gets together: Not on file     Attends Jain service: Not on file     Active member of club or organization: Not on file     Attends meetings of clubs or organizations: Not on file     Relationship status: Not on file     Intimate partner violence:     Fear of current or ex partner: Not on file     Emotionally abused: Not on file     Physically abused: Not on file     Forced sexual activity: Not on file   Other Topics Concern     Parent/sibling w/ CABG, MI or angioplasty before 65F 55M? No      Service Not Asked     Blood Transfusions Not Asked     Caffeine Concern Yes     Comment: coffee in the am     Occupational Exposure Not Asked     Hobby Hazards Not Asked  "    Sleep Concern No     Comment: since surgery been sleepy \"Ok\"     Stress Concern Yes     Comment: self employed     Weight Concern Not Asked     Special Diet Not Asked     Back Care Not Asked     Exercise Yes     Comment: uses treadmill daily     Bike Helmet Not Asked     Seat Belt Not Asked     Self-Exams Not Asked   Social History Narrative     Not on file        FAMILY HISTORY:   Family History   Problem Relation Age of Onset     Coronary Artery Disease Father 58        heart valve replacement     Myocardial Infarction Father      Diabetes Mother      Colon Cancer No family hx of         EXAM:Vitals: /79   Pulse 61   Ht 1.778 m (5' 10\")   Wt 86.2 kg (190 lb)   BMI 27.26 kg/m    BMI= Body mass index is 27.26 kg/m .    General appearance: Patient is alert and fully cooperative with history & exam.  No sign of distress is noted during the visit.     Psychiatric: Affect is pleasant & appropriate.  Patient appears motivated to improve health.     Respiratory: Breathing is regular & unlabored while sitting.     HEENT: Hearing is intact to spoken word.  Speech is clear.  No gross evidence of visual impairment that would impact ambulation.     Dermatologic: Skin is intact to R foot/ankle.  No paronychia or evidence of soft tissue infection is noted.     Vascular: DP & PT pulses are intact & regular on the R.  No significant edema or varicosities noted.  CFT and skin temperature are normal to both lower extremities.     Neurologic: Lower extremity sensation is intact to light touch.  No evidence of weakness or contracture in the lower extremities.  No evidence of neuropathy.     Musculoskeletal: R posterolateral bone bump.  Pain to palpation of R Achilles tendon insertion and at the bump area.  No 1st MPJ pain at this time.  Patient is ambulatory without assistive device or brace.  No gross ankle deformity noted.  No foot or ankle joint effusion is noted.     ASSESSMENT:   R foot gout, Buck's deformity   "   PLAN:  Reviewed patient's chart in epic.  Discussed condition and treatment options including pros and cons.    Suspect gout.  Usually involves the 1st MPJ area, and this is where pt's pain started.  Heel is also a common location of gout.  Buck's also noted in this area, so cannot r/o contribution of this deformity.  Insertional Achilles tendonitis may also be a factor.    Will treat with Medrol.  Discussed etiology of gout, foods to avoid.  Gentle stretching, heel cup also advised.  Wear shoes that don't irritate the area.    Will check labs:  Uric acid, ESR, CRP.    Handouts given.  F/u prn.     Mervin Gardner DPM, FACFAS    Weight management plan: Patient was referred to their PCP to discuss a diet and exercise plan.

## 2019-07-01 NOTE — LETTER
7/1/2019         RE: Tip Aguilar  [address]  [place] [address]        Dear Colleague,    Thank you for referring your patient, Tip Aguilar, to the [facility]. Please see a copy of my visit note below.    PATIENT HISTORY:  Tip Aguilar is a 54 year old male who presents to clinic for R foot swelling, pain.  3 week duration.  Began in the 1st MPJ area.  Has moved to posterior heel area.  Worse with walking, standing.  Ibuprofen can help.  4-10/10 pain.  No injury.      Review of Systems:  Patient denies fever, chills, rash, wound, stiffness, limping, numbness, weakness, heart burn, blood in stool, chest pain with activity, calf pain when walking, shortness of breath with activity, chronic cough, easy bleeding/bruising, excessive thirst, fatigue, depression, anxiety.  Patient admits to swelling of ankles.     PAST MEDICAL HISTORY:   Past Medical History:   Diagnosis Date     ASCVD (arteriosclerotic cardiovascular disease)      ED (erectile dysfunction)      STEMI (ST elevation myocardial infarction) (H)         PAST SURGICAL HISTORY:   Past Surgical History:   Procedure Laterality Date     CARDIAC CATHERIZATION  2/13/16    Successful PCI with PAVITHRA placement in the rPLA/distal RCA, mid RCA     VASCULAR SURGERY      Stent replace (coronary)        MEDICATIONS:   Current Outpatient Medications:      methylPREDNISolone (MEDROL DOSEPAK) 4 MG tablet therapy pack, Follow Package Directions, Disp: 21 tablet, Rfl: 0     aspirin 81 MG tablet, Take 1 tablet (81 mg) by mouth daily, Disp: 30 tablet, Rfl:      atorvastatin (LIPITOR) 80 MG tablet, Take 1 tablet (80 mg) by mouth At Bedtime, Disp: 90 tablet, Rfl: 3     Bioflavonoid Products (VITAMIN C) CHEW, , Disp: , Rfl:      lisinopril (PRINIVIL/ZESTRIL) 5 MG tablet, Take 1 tablet (5 mg) by mouth daily, Disp: 90 tablet, Rfl: 3     metoprolol tartrate (LOPRESSOR) 25 MG tablet, Take 1 tablet (25 mg) by mouth 2 times daily, Disp: 180  tablet, Rfl: 3     nitroGLYcerin (NITROSTAT) 0.4 MG sublingual tablet, Place 1 tablet (0.4 mg) under the tongue every 5 minutes as needed for chest pain if you are still having symptoms after 3 doses (15 minutes) call 911., Disp: 25 tablet, Rfl: 0     sildenafil (REVATIO) 20 MG tablet, TAKE ONE TO FIVE TABLETS BY MOUTH DAILY AS NEEDED.   (DO NOT TAKE WITH NITROGLYCERIN, TERAZOSIN, OR DOXAZOSIN), Disp: 40 tablet, Rfl: 11     vitamin E 400 units TABS, Take 400 Units by mouth daily, Disp: , Rfl:      ALLERGIES:  No Known Allergies     SOCIAL HISTORY:   Social History     Socioeconomic History     Marital status: Single     Spouse name: Not on file     Number of children: Not on file     Years of education: Not on file     Highest education level: Not on file   Occupational History     Not on file   Social Needs     Financial resource strain: Not on file     Food insecurity:     Worry: Not on file     Inability: Not on file     Transportation needs:     Medical: Not on file     Non-medical: Not on file   Tobacco Use     Smoking status: Never Smoker     Smokeless tobacco: Never Used   Substance and Sexual Activity     Alcohol use: Yes     Alcohol/week: 0.0 oz     Comment: beer or wine  6-10 per week     Drug use: No     Sexual activity: Yes     Partners: Female     Birth control/protection: Surgical   Lifestyle     Physical activity:     Days per week: Not on file     Minutes per session: Not on file     Stress: Not on file   Relationships     Social connections:     Talks on phone: Not on file     Gets together: Not on file     Attends Episcopal service: Not on file     Active member of club or organization: Not on file     Attends meetings of clubs or organizations: Not on file     Relationship status: Not on file     Intimate partner violence:     Fear of current or ex partner: Not on file     Emotionally abused: Not on file     Physically abused: Not on file     Forced sexual activity: Not on file   Other Topics  "Concern     Parent/sibling w/ CABG, MI or angioplasty before 65F 55M? No      Service Not Asked     Blood Transfusions Not Asked     Caffeine Concern Yes     Comment: coffee in the am     Occupational Exposure Not Asked     Hobby Hazards Not Asked     Sleep Concern No     Comment: since surgery been sleepy \"Ok\"     Stress Concern Yes     Comment: self employed     Weight Concern Not Asked     Special Diet Not Asked     Back Care Not Asked     Exercise Yes     Comment: uses treadmill daily     Bike Helmet Not Asked     Seat Belt Not Asked     Self-Exams Not Asked   Social History Narrative     Not on file        FAMILY HISTORY:   Family History   Problem Relation Age of Onset     Coronary Artery Disease Father 58        heart valve replacement     Myocardial Infarction Father      Diabetes Mother      Colon Cancer No family hx of         EXAM:Vitals: /79   Pulse 61   Ht 1.778 m (5' 10\")   Wt 86.2 kg (190 lb)   BMI 27.26 kg/m     BMI= Body mass index is 27.26 kg/m .    General appearance: Patient is alert and fully cooperative with history & exam.  No sign of distress is noted during the visit.     Psychiatric: Affect is pleasant & appropriate.  Patient appears motivated to improve health.     Respiratory: Breathing is regular & unlabored while sitting.     HEENT: Hearing is intact to spoken word.  Speech is clear.  No gross evidence of visual impairment that would impact ambulation.     Dermatologic: Skin is intact to R foot/ankle.  No paronychia or evidence of soft tissue infection is noted.     Vascular: DP & PT pulses are intact & regular on the R.  No significant edema or varicosities noted.  CFT and skin temperature are normal to both lower extremities.     Neurologic: Lower extremity sensation is intact to light touch.  No evidence of weakness or contracture in the lower extremities.  No evidence of neuropathy.     Musculoskeletal: R posterolateral bone bump.  Pain to palpation of R Achilles " tendon insertion and at the bump area.  No 1st MPJ pain at this time.  Patient is ambulatory without assistive device or brace.  No gross ankle deformity noted.  No foot or ankle joint effusion is noted.     ASSESSMENT:   R foot gout, Buck's deformity     PLAN:  Reviewed patient's chart in epic.  Discussed condition and treatment options including pros and cons.    Suspect gout.  Usually involves the 1st MPJ area, and this is where pt's pain started.  Heel is also a common location of gout.  Buck's also noted in this area, so cannot r/o contribution of this deformity.  Insertional Achilles tendonitis may also be a factor.    Will treat with Medrol.  Discussed etiology of gout, foods to avoid.  Gentle stretching, heel cup also advised.  Wear shoes that don't irritate the area.    Handouts given.  F/u prn.     Mervin Gardner DPM, FACFAS    Weight management plan: Patient was referred to their PCP to discuss a diet and exercise plan.      Again, thank you for allowing me to participate in the care of your patient.        Sincerely,        Mervin Gardner DPM

## 2019-07-02 ENCOUNTER — TELEPHONE (OUTPATIENT)
Dept: PODIATRY | Facility: CLINIC | Age: 54
End: 2019-07-02

## 2019-07-02 LAB — URATE SERPL-MCNC: 6.1 MG/DL (ref 3.5–7.2)

## 2019-07-02 NOTE — TELEPHONE ENCOUNTER
LVM for return call to discuss results ane recommendations. Asked if we could leave detailed VM as well.     Phone Number patient can be reached at:  Home number on file [phone] (home)    Can we leave a detailed message on this number:  unknown    Mervin Goldberg ATC, DPM  [facility] Podiat[facility] Rn             Please call pt.   All labs are normal.   I still suspect he had prior gout.   Continue current treatment plan.   Follow up in clinic as needed.

## 2019-07-03 NOTE — TELEPHONE ENCOUNTER
Patient returned call - 7/2/19    Called patient back this am - 7/3/19 and went over results and recommendations. Had no questions.    Yenny Hayden ATC

## 2019-07-10 ENCOUNTER — TELEPHONE (OUTPATIENT)
Dept: FAMILY MEDICINE | Facility: CLINIC | Age: 54
End: 2019-07-10

## 2019-07-10 NOTE — TELEPHONE ENCOUNTER
Called patient, relayed provider message below, patient verbalized understanding and is in agreement with plan    Jaycee Warren, RN  Triage Nurse

## 2019-07-10 NOTE — TELEPHONE ENCOUNTER
I would advise PCP visit or urgent care visit today.  I am out of clinic this PM and have no openings for the rest of the week.  Pt should see PCP regardless to discuss long term management of gout if he is having attacks this frequently.

## 2019-07-10 NOTE — TELEPHONE ENCOUNTER
Reason for Call:  Other call back    Detailed comments: Pt called and wants to speak with a nurse to see if he should come in. He thinks his gout is coming back. Please Advise    Phone Number Patient can be reached at: Home number on file [phone] (home)    Best Time: anytime    Can we leave a detailed message on this number? YES    Call taken on 7/10/2019 at 10:16 AM by Penny Roe

## 2019-07-10 NOTE — TELEPHONE ENCOUNTER
Dr. Gardner,   Spoke with patient. Patient states his completed methylPREDNISolone (MEDROL DOSEPAK) pack on Sunday.     Patient states on Tuesday he had return of his gout symptoms. Patient is wondering what plan going forward is to help with symptom management    Pharmacy cued    Please advise    Thank You!  Jaycee Warren RN  Triage Nurse

## 2019-07-15 ENCOUNTER — OFFICE VISIT (OUTPATIENT)
Dept: PODIATRY | Facility: CLINIC | Age: 54
End: 2019-07-15
Payer: COMMERCIAL

## 2019-07-15 ENCOUNTER — OFFICE VISIT (OUTPATIENT)
Dept: FAMILY MEDICINE | Facility: CLINIC | Age: 54
End: 2019-07-15
Payer: COMMERCIAL

## 2019-07-15 VITALS
TEMPERATURE: 98.5 F | HEART RATE: 66 BPM | DIASTOLIC BLOOD PRESSURE: 69 MMHG | RESPIRATION RATE: 18 BRPM | BODY MASS INDEX: 29.13 KG/M2 | SYSTOLIC BLOOD PRESSURE: 106 MMHG | WEIGHT: 203 LBS | OXYGEN SATURATION: 95 %

## 2019-07-15 VITALS
SYSTOLIC BLOOD PRESSURE: 106 MMHG | HEIGHT: 70 IN | DIASTOLIC BLOOD PRESSURE: 69 MMHG | BODY MASS INDEX: 29.06 KG/M2 | HEART RATE: 66 BPM | WEIGHT: 203 LBS

## 2019-07-15 DIAGNOSIS — M10.9 ACUTE GOUT OF RIGHT FOOT, UNSPECIFIED CAUSE: Primary | ICD-10-CM

## 2019-07-15 DIAGNOSIS — M10.071 ACUTE IDIOPATHIC GOUT INVOLVING TOE OF RIGHT FOOT: Primary | ICD-10-CM

## 2019-07-15 DIAGNOSIS — M79.671 RIGHT FOOT PAIN: ICD-10-CM

## 2019-07-15 LAB
CRYSTALS SNV MICRO: NORMAL
SPECIMEN SOURCE SNV: NORMAL

## 2019-07-15 PROCEDURE — 99213 OFFICE O/P EST LOW 20 MIN: CPT | Performed by: FAMILY MEDICINE

## 2019-07-15 PROCEDURE — 89060 EXAM SYNOVIAL FLUID CRYSTALS: CPT | Performed by: PODIATRIST

## 2019-07-15 PROCEDURE — 20600 DRAIN/INJ JOINT/BURSA W/O US: CPT | Mod: RT | Performed by: PODIATRIST

## 2019-07-15 PROCEDURE — 99214 OFFICE O/P EST MOD 30 MIN: CPT | Mod: 25 | Performed by: PODIATRIST

## 2019-07-15 RX ORDER — PREDNISONE 20 MG/1
20 TABLET ORAL DAILY
Qty: 5 TABLET | Refills: 0 | Status: SHIPPED | OUTPATIENT
Start: 2019-07-15 | End: 2019-07-20

## 2019-07-15 ASSESSMENT — ENCOUNTER SYMPTOMS
HEMATURIA: 0
RHINORRHEA: 0
CHILLS: 0
SHORTNESS OF BREATH: 0
DIARRHEA: 0
NUMBNESS: 0
VOMITING: 0
NAUSEA: 0
JOINT SWELLING: 1
DYSURIA: 0
ARTHRALGIAS: 1
PARESTHESIAS: 0
COUGH: 0
HEADACHES: 0
COLOR CHANGE: 1
ACTIVITY CHANGE: 0
WOUND: 0
FEVER: 0
SORE THROAT: 0

## 2019-07-15 ASSESSMENT — MIFFLIN-ST. JEOR: SCORE: 1777.05

## 2019-07-15 NOTE — PROGRESS NOTES
Subjective     Tip Aguilar is a 54 year old male who presents to clinic today for the following health issues:    Tip is a 53 y/o M here today s/p ~5 weeks of waxing and waning pain, swelling, erythema and warmth in his R 1st MTP joint which then seemed to spread to his ankle.  He rates his pain 8/10 at its worst.  He denies any other sx (including fever), personal or family hx of gout, renal disease, HTN, diabetes or arthritis.       Tip initially treated his sx with ice and ibuprofen, which provided limited relief.  On 7/1/19 (15 days ago), we was seen by Dr. Gardner (Podiatry), who dx'd him with gout and rx'd him a Medrol dose pack; this reduced his sx considerably.  Dr. Gardner rober ESR, CRP and uric acid levels then, which were nl.  Although his symptoms did improve and basically resolve with the medrol dose pack, they unfortunately returned one day after he had finished the taper.  At this point, his symptoms are improved but still with waxing and waning redness, swelling and pain to his right great toe at the first MTP; he has not had recurrence of symptoms in his ankle    He reports decreasing his consumption of shrimp and beer since the gout diagnosis.      No other complaints at this time.       Gout  Duration: ongoing   Description   Location: foot - right  Joint Swelling: YES, not as bas as last week   Redness: YES- initially   Pain intensity:  2/10, at its worst 9/10  Accompanying signs and symptoms: None  History  Previous history of gout: no    Trauma to the area: no   Precipitating factors:   Alcohol usage: socially  Diuretic use: no   Recent illness: no   Therapies tried and outcome: antibiotic outcome not effective         Patient Active Problem List   Diagnosis     Coronary artery disease involving native heart without angina pectoris, unspecified vessel or lesion type     Health Care Home     Past Surgical History:   Procedure Laterality Date     CARDIAC CATHERIZATION  2/13/16     Successful PCI with PAVITHRA placement in the rPLA/distal RCA, mid RCA     VASCULAR SURGERY      Stent replace (coronary)       Social History     Tobacco Use     Smoking status: Never Smoker     Smokeless tobacco: Never Used   Substance Use Topics     Alcohol use: Yes     Alcohol/week: 0.0 oz     Comment: beer or wine  6-10 per week     Family History   Problem Relation Age of Onset     Coronary Artery Disease Father 58        heart valve replacement     Myocardial Infarction Father      Diabetes Mother      Colon Cancer No family hx of          Current Outpatient Medications   Medication Sig Dispense Refill     aspirin 81 MG tablet Take 1 tablet (81 mg) by mouth daily 30 tablet      atorvastatin (LIPITOR) 80 MG tablet Take 1 tablet (80 mg) by mouth At Bedtime 90 tablet 3     Bioflavonoid Products (VITAMIN C) CHEW        lisinopril (PRINIVIL/ZESTRIL) 5 MG tablet Take 1 tablet (5 mg) by mouth daily 90 tablet 3     methylPREDNISolone (MEDROL DOSEPAK) 4 MG tablet therapy pack Follow Package Directions 21 tablet 0     metoprolol tartrate (LOPRESSOR) 25 MG tablet Take 1 tablet (25 mg) by mouth 2 times daily 180 tablet 3     nitroGLYcerin (NITROSTAT) 0.4 MG sublingual tablet Place 1 tablet (0.4 mg) under the tongue every 5 minutes as needed for chest pain if you are still having symptoms after 3 doses (15 minutes) call 911. 25 tablet 0     sildenafil (REVATIO) 20 MG tablet TAKE ONE TO FIVE TABLETS BY MOUTH DAILY AS NEEDED.   (DO NOT TAKE WITH NITROGLYCERIN, TERAZOSIN, OR DOXAZOSIN) 40 tablet 11     vitamin E 400 units TABS Take 400 Units by mouth daily       predniSONE (DELTASONE) 20 MG tablet Take 1 tablet (20 mg) by mouth daily for 5 days 5 tablet 0     No Known Allergies  Recent Labs   Lab Test 08/21/18  1346 03/21/17  1128 05/05/16  0729  02/15/16  0645 02/14/16  0920  07/02/15  0850   A1C  --   --   --   --   --   --   --  5.8   LDL 77 132* 69  --   --   --    < > Cannot estimate LDL when triglyceride exceeds 400 mg/dL   152*   HDL 34* 45 38*  --   --   --    < > 34*   TRIG 287* 231* 97  --   --   --    < > 415*   ALT  --   --  34  --  56 66  --   --    CR 0.81 0.92  --    < > 1.04 1.00   < >  --    GFRESTIMATED >90 87  --    < > 76 80   < >  --    GFRESTBLACK >90 >90  African American GFR Calc    --    < > >90  CORRECTED ON 02/15 AT 0723: PREVIOUSLY REPORTED AS >90  GFR Calc   >90   GFR Calc     < >  --    POTASSIUM 3.8 4.1  --    < > 4.2 4.7   < >  --    TSH  --   --   --   --   --   --   --  2.07    < > = values in this interval not displayed.      BP Readings from Last 3 Encounters:   07/15/19 106/69   07/15/19 106/69   07/01/19 116/79    Wt Readings from Last 3 Encounters:   07/15/19 92.1 kg (203 lb)   07/15/19 92.1 kg (203 lb)   07/01/19 86.2 kg (190 lb)           Reviewed and updated as needed this visit by Provider         Review of Systems   Constitutional: Negative for activity change, chills and fever.   HENT: Negative for congestion, ear pain, postnasal drip, rhinorrhea, sneezing and sore throat.    Respiratory: Negative for cough and shortness of breath.    Cardiovascular: Negative for chest pain and peripheral edema.   Gastrointestinal: Negative for diarrhea, nausea and vomiting.   Genitourinary: Negative for dysuria and hematuria.   Musculoskeletal: Positive for arthralgias, gait problem and joint swelling.        Pain and swelling in R 1st MTP joint; gait problem secondary to MTP pain   Skin: Positive for color change. Negative for rash and wound.        Erythema over R 1st MTP   Neurological: Negative for numbness, headaches and paresthesias.            Objective    /69 (BP Location: Left arm, Patient Position: Sitting, Cuff Size: Adult Large)   Pulse 66   Temp 98.5  F (36.9  C) (Oral)   Resp 18   Wt 92.1 kg (203 lb)   SpO2 95%   BMI 29.13 kg/m    Body mass index is 29.13 kg/m .  Physical Exam   GENERAL: healthy, alert and no distress  EYES: Eyes grossly normal to  "inspection, PERRL and conjunctivae and sclerae normal  HENT: ear canals and TM's normal, nose and mouth without ulcers or lesions  NECK: no adenopathy, no asymmetry, masses, or scars and thyroid normal to palpation  RESP: lungs clear to auscultation - no rales, rhonchi or wheezes  CV: regular rate and rhythm, normal S1 S2, no S3 or S4, no murmur, click or rub, no peripheral edema and peripheral pulses strong  MS: Swelling, erythema, warmth and slight TTP over R 1st MTP; no induration or fluctuance.  ROM nl without crepitance or other deformity.  No other gross musculoskeletal defects noted.  NEURO: Mentation intact and speech normal  PSYCH: Affect normal/bright    Diagnostic Test Results:  Labs reviewed in Epic, particularly ESR, CRP and uric acid levels from 7/1/2019        Assessment & Plan   Assessment : Acutely recurring gout.   Comments: Septic arthritis and RA are less likely given the absence of fever and other systemic sx, and nl ESR and CRP on 7/1.  This is likely another flare of gout after an initial improvement on oral steroids.   Plan: Pt referred to Dr. Gardner (Podiatry) for discussion of joint aspiration and possible steroid injection vs another oral course of steroids.    BMI:   Estimated body mass index is 29.13 kg/m  as calculated from the following:    Height as of 7/1/19: 1.778 m (5' 10\").    Weight as of this encounter: 92.1 kg (203 lb).           CONSULTATION/REFERRAL to Dr. Gardner (Podiatry) today.  Appreciate his recommendations and further care of the patient.     No follow-ups on file.    Senthil Echavarria, Medical Student    In supervising the medical student , I have repeated the exam and pertinent history as documented above.  I have reviewed and edited the medical student's documentation appropriately.  The above documentation is correct.       Norma Ovalles MD  [facility]            "

## 2019-07-15 NOTE — LETTER
"    7/15/2019         RE: Tip Aguilar  [address]  [place] [address]        Dear Colleague,    Thank you for referring your patient, Tip Aguilar, to the [facility]. Please see a copy of my visit note below.    PATIENT HISTORY:  Tip Aguilar is a 54 year old male who presents to clinic as an urgent add on from primary care Dr. Ovalles for recurrence of R 1st MPJ pain.  Pt seen by me for suspected gout 7/1 and placed on Medrol.  Pain during that event started at 1st MPJ and moved to the heel.  Medrol helped significantly, and his symptoms resolved.  Day after the taper symptoms increased again at the 1st MPJ with redness, swelling, pain.  This has been steadily improving on its own over the past few days.  No fevers, chills, nausea, vomiting.  Nonsmoker.  Pt works in auto repair.  Nondiabetic.  No related family hx.       EXAM:Vitals: /69   Pulse 66   Ht 1.778 m (5' 10\")   Wt 92.1 kg (203 lb)   BMI 29.13 kg/m     BMI= Body mass index is 29.13 kg/m .    General appearance: Patient is alert and fully cooperative with history & exam.  No sign of distress is noted during the visit.     Dermatologic: R 1st MPJ with mild edema, mild erythema.  No wound noted.     Vascular: DP & PT pulses are intact & regular on the R.  CFT and skin temperature are normal to both lower extremities.     Neurologic: Lower extremity sensation is intact to light touch.  No evidence of weakness or contracture in the lower extremities.  No evidence of neuropathy.     Musculoskeletal: Mild pain to palpation of medial R 1st MPJ area.  Minimal to no pain with joint motion.  Patient is ambulatory without assistive device or brace.  No gross ankle deformity noted.  No foot or ankle joint effusion is noted.    XR staff not available today, so was unable to obtain.    Labs at last visit including ESR, CRP, uric acid were normal.     ASSESSMENT: R foot pain, suspect recurrence of acute gout   "   PLAN:  Reviewed patient's chart in epic.  Discussed condition and treatment options including pros and cons.    Pt added on today for consideration of aspiration, possibly steroid injection.  Aspiration is reasonable for diagnostic purposes, but I would avoid steroid injection for now w/o seeing an x-ray first to r/o other bony pathology.  Regardless, I still suspect gout given improvement on Medrol.  Labs were normal.  Infection very unlikely.  Pt was amenable to aspiration today.      Procedure:  Risks vs benefits and alternatives discussed including risk of infection, lack of sufficient aspirate to make a diagnosis.  After alcohol prep, 1cc of 1% lidocaine plain was injected under the skin of the dorsolateral aspect of the 1st MPJ.  Betadine prep performed.  Using sterile instrumentation and technique, an 18 gauge needle on a 10cc syringe was inserted into the 1st MPJ dorsolaterally and trace amounts of a serosanguinous fluid was aspirated.  I attempted again with a shorter 18 gauge needle and a new syringe, but again, only a trace amount could be aspirated.  No purulence noted.  Discussing with the lab only enough fluid for crystal analysis was obtained, so this was ordered.  I had intended on doing cell count and cultures, but not enough aspirate could be obtained to run these tests.    After discussion with Dr. Ovalles, will also do a 2nd short course of steroids, Prednisone 20mg daily for 5 days.  NSAIDs are not a good option for him given medication interaction concerns.    Pt agrees with plan.  Gout handout given with foods to avoid.  Pt should follow-up with PCP for recurrent attacks to consider longer term medication.  F/u otherwise prn.       Mervin Gardner DPM, FACFAS    Weight management plan: Patient was referred to their PCP to discuss a diet and exercise plan.        Again, thank you for allowing me to participate in the care of your patient.        Sincerely,        Mervin Gardner,  KAL

## 2019-07-15 NOTE — PROGRESS NOTES
"PATIENT HISTORY:  Tip Aguilar is a 54 year old male who presents to clinic as an urgent add on from primary care Dr. Ovalles for recurrence of R 1st MPJ pain.  Pt seen by me for suspected gout 7/1 and placed on Medrol.  Pain during that event started at 1st MPJ and moved to the heel.  Medrol helped significantly, and his symptoms resolved.  Day after the taper symptoms increased again at the 1st MPJ with redness, swelling, pain.  This has been steadily improving on its own over the past few days.  No fevers, chills, nausea, vomiting.  Nonsmoker.  Pt works in auto repair.  Nondiabetic.  No related family hx.       EXAM:Vitals: /69   Pulse 66   Ht 1.778 m (5' 10\")   Wt 92.1 kg (203 lb)   BMI 29.13 kg/m    BMI= Body mass index is 29.13 kg/m .    General appearance: Patient is alert and fully cooperative with history & exam.  No sign of distress is noted during the visit.     Dermatologic: R 1st MPJ with mild edema, mild erythema.  No wound noted.     Vascular: DP & PT pulses are intact & regular on the R.  CFT and skin temperature are normal to both lower extremities.     Neurologic: Lower extremity sensation is intact to light touch.  No evidence of weakness or contracture in the lower extremities.  No evidence of neuropathy.     Musculoskeletal: Mild pain to palpation of medial R 1st MPJ area.  Minimal to no pain with joint motion.  Patient is ambulatory without assistive device or brace.  No gross ankle deformity noted.  No foot or ankle joint effusion is noted.    XR staff not available today, so was unable to obtain.    Labs at last visit including ESR, CRP, uric acid were normal.     ASSESSMENT: R foot pain, suspect recurrence of acute gout     PLAN:  Reviewed patient's chart in epic.  Discussed condition and treatment options including pros and cons.    Pt added on today for consideration of aspiration, possibly steroid injection.  Aspiration is reasonable for diagnostic purposes, but I would " avoid steroid injection for now w/o seeing an x-ray first to r/o other bony pathology.  Regardless, I still suspect gout given improvement on Medrol.  Labs were normal.  Infection very unlikely.  Pt was amenable to aspiration today.      Procedure:  Risks vs benefits and alternatives discussed including risk of infection, lack of sufficient aspirate to make a diagnosis.  After alcohol prep, 1cc of 1% lidocaine plain was injected under the skin of the dorsolateral aspect of the 1st MPJ.  Betadine prep performed.  Using sterile instrumentation and technique, an 18 gauge needle on a 10cc syringe was inserted into the 1st MPJ dorsolaterally and trace amounts of a serosanguinous fluid was aspirated.  I attempted again with a shorter 18 gauge needle and a new syringe, but again, only a trace amount could be aspirated.  No purulence noted.  Discussing with the lab only enough fluid for crystal analysis was obtained, so this was ordered.  I had intended on doing cell count and cultures, but not enough aspirate could be obtained to run these tests.    After discussion with Dr. Ovalles, will also do a 2nd short course of steroids, Prednisone 20mg daily for 5 days.  NSAIDs are not a good option for him given medication interaction concerns.    Pt agrees with plan.  Gout handout given with foods to avoid.  Pt should follow-up with PCP for recurrent attacks to consider longer term medication.  F/u otherwise prn.       Mervin Gardner DPM, FACFAS    Weight management plan: Patient was referred to their PCP to discuss a diet and exercise plan.

## 2019-07-15 NOTE — PATIENT INSTRUCTIONS
Thank you for choosing [facility]!    Follow up as needed    DR. GORE'S CLINIC LOCATIONS     MONDAY  [place] TUESDAY & FRIDAY AM  [place]   [address]   [address]   [place] [address] [place] [address]   [phone]  [phone]  [phone]       WEDNESDAY  [facility] [place] SCHEDULE SURGERY: [phone]   [address] [facility] APPOINTMENTS: [phone]   [place] [address] BILLING QUESTIONS: [phone]   [phone]         GOUT  Gout is a disorder that results from the build-up of uric acid in the tissues or a joint. It most often affects the joint of the big toe.  CAUSES  Gout attacks are caused by deposits of crystallized uric acid in the joint. Uric acid is present in the blood and eliminated in the urine, but in people who have gout, uric acid accumulates and crystallizes in the joints. Uric acid is the result of the breakdown of purines, chemicals that are found naturally in our bodies and in food. Some people develop gout because their kidneys have difficulty eliminating normal amounts of uric acid, while others produce too much uric acid.  Gout occurs most commonly in the big toe because uric acid is sensitive to temperature changes. At cooler temperatures, uric acid turns into crystals. Since the toe is the part of the body that is farthest from the heart, it s also the coolest part of the body - and, thus, the most likely target of gout. However, gout can affect any joint in the body.  The tendency to accumulate uric acid is often inherited. Other factors that put a person at risk for developing gout include: high blood pressure, diabetes, obesity, surgery, chemotherapy, stress, and certain medications and vitamins. For example, the body s ability to remove uric acid can be negatively affected by taking aspirin, some diuretic medications ( water pills ), and the vitamin niacin (also called  nicotinic acid). While gout is more common in men aged 40 to 60 years, it can occur in younger men as well as in women.  Consuming foods and beverages that contain high levels of purines can trigger an attack of gout. Some foods contain more purines than others and have been associated with an increase of uric acid, which leads to gout. You may be able to reduce your chances of getting a gout attack by limiting or avoiding shellfish, organ meats (kidney, liver, etc.), red wine, beer, and red meat.  Other Triggers include but are not limited to:  Congestive heart failure, deep vein thrombosis, pneumonia, fasting, dehydration, trauma/surgery, psoriasis.  SYMTOMS  An attack of gout can be miserable, marked by the following symptoms:  Intense pain that comes on suddenly - often in the middle of the night or upon arising   Signs of inflammation such as redness, swelling, and warmth over the joint.   DIAGNOSIS  To diagnose gout, the foot and ankle surgeon will ask questions about your personal and family medical history, followed by an examination of the affected joint. Laboratory tests and x-rays are sometimes ordered to determine if the inflammation is caused by something other than gout.  TREATMENT  Initial treatment of an attack of gout typically includes the following:  Medications. Prescription medications or injections are used to treat the pain, swelling, and inflammation.   Dietary restrictions. Foods and beverages that are high in purines should be avoided, since purines are converted in the body to uric acid.   Fluids. Drink plenty of water and other fluids each day, while also avoiding alcoholic beverages, which cause dehydration. Cherry Juice works well to help decrease pain.  Immobilize and elevate the foot. Avoid standing and walking to give your foot a rest. Also, elevate your foot (level with or slightly above the heart) to help reduce swelling.   The symptoms of gout and the inflammatory process usually  resolve in three to ten days with treatment. If gout symptoms continue despite the initial treatment, or if repeated attacks occur, see your primary care physician for maintenance treatment that may involve daily medication. In cases of repeated episodes, the underlying problem must be addressed, as the build-up of uric acid over time can cause arthritic damage to the joint.  DIET CHANGE  A gout diet reduces your intake of foods that are high in purines, which helps control your body's production of uric acid. If you're overweight or obese, lose weight. However, avoid fasting and rapid weight loss because these can promote a gout attack. Drink plenty of fluids to help flush uric acid from your body. Also avoid high-protein diets, which can cause you to produce too much uric acid (hyperuricemia).   To follow the diet:   Limit meat, poultry and fish. Animal proteins are high in purine. Avoid or severely limit high-purine foods, such as organ meats, herring, anchovies and mackerel. Red meat (beef, pork and lamb), fatty fish and seafood (tuna, shrimp, lobster and scallops) are associated with increased risk of gout. Because all meat, poultry and fish contain purines, limit your intake to 4 to 6 ounces (113 to 170 grams) daily.   Eat more plant-based proteins. You can increase your protein by including more plant-based sources, such as beans and legumes. This switch will also help you cut down on saturated fats, which may indirectly contribute to obesity and gout.   Limit or avoid alcohol. Alcohol interferes with the elimination of uric acid from your body. Drinking beer, in particular, has been linked to gout attacks. If you're having an attack, avoid alcohol. However, when you're not having an attack, drinking one or two 5-ounce (148 milliliter) servings a day of wine is not likely to increase your risk.   Drink plenty of fluids, particularly water. Fluids can help remove uric acid from your body. Aim for eight to 16  8-ounce (237 milliliter) glasses a day.   Choose low-fat or fat-free dairy products. Some studies have shown that drinking skim or low-fat milk and eating foods made with them, such as yogurt, help reduce the risk of gout. Aim for adequate dairy intake of 16 to 24 fluid ounces (473 to 710 milliliters) daily.   Choose complex carbohydrates. Eat more whole grains and fruits and vegetables and fewer refined carbohydrates, such as white bread, cakes and candy.   Limit or avoid sugar. Too many sweets can leave you with no room for plant-based proteins and low-fat or fat-free dairy products -- the foods you need to avoid gout. Sugary foods also tend to be high in calories, so they make it easier to eat more than you're likely to burn off. Although there's debate about whether sugar has a direct effect on uric acid levels, sweets are definitely linked to overweight and obesity.   There's also some evidence that drinking four to six cups of coffee a day lowers gout risk in men.   RESULTS  Following a gout diet can help you limit your body's uric acid production and increase its elimination. It's not likely to lower the uric acid concentration in your blood enough to treat your gout without medication, but it may help decrease the number of attacks and limit their severity. Following the gout diet and limiting your calories -- particularly if you also add in moderate daily exercise, such as brisk walking -- also can improve your overall health by helping you achieve and maintain a healthy weight.             BODY WEIGHT AND YOUR FEET  The following information is included in the after visit summary for all patients. Body weight can be a sensitive issue to discuss in clinic, but we think the following information is very important. Although we focus on the feet and ankles, we do support the overall health of our patients.     Many things can cause foot and ankle problems. Foot structure, activity level, foot mechanics and  injuries are common causes of pain. One very important issue that often goes unmentioned, is body weight. Extra weight can cause increased stress on muscles, ligaments, bones and tendons. Sometimes just a few extra pounds is all it takes to put one over her/his threshold. Without reducing that stress, it can be difficult to alleviate pain. As Foot & Ankle specialists, our job is addressing the lower extremity problem and possible causes. Regarding extra body weight, we encourage patients to discuss diet and weight management plans with their primary care doctors. It is this team approach that gives you the best opportunity for pain relief and getting you back on your feet.      [facility] has a Comprehensive Weight Management Program. This program includes counseling, education, non-surgical and surgical approaches to weight loss. If you are interested in learning more either talk to you primary care provider or call [phone].

## 2019-07-17 ENCOUNTER — TELEPHONE (OUTPATIENT)
Dept: PODIATRY | Facility: CLINIC | Age: 54
End: 2019-07-17

## 2019-07-17 NOTE — TELEPHONE ENCOUNTER
Spoke to patient discussed results and recommendations. He noted understanding and had no further questions     Phone Number patient can be reached at:  Cell number on file:    Telephone Information:   Mobile [phone]       Can we leave a detailed message on this number:  unknown    Mervin Goldberg ATC, KAL  [facility]             Please call pt.   Synovial fluid analysis from his joint tap did not show any crystals.   His presentation is still consistent with gout.   We usually see crystals with gout, but sometimes no crystals are present if acute gout is subsiding.     I was unable to get much fluid out of his joint, so this may also affect results.   There was not enough fluid to run any other tests.   I would continue the current treatment plan.   Pt should f/u with primary care for any recurrent attacks as he may need to be on longer term medication for gout.   Thanks.

## 2019-09-05 DIAGNOSIS — N52.9 ERECTILE DYSFUNCTION, UNSPECIFIED ERECTILE DYSFUNCTION TYPE: ICD-10-CM

## 2019-09-05 NOTE — TELEPHONE ENCOUNTER
"Requested Prescriptions   Pending Prescriptions Disp Refills     sildenafil (REVATIO) 20 MG tablet  Last Written Prescription Date:  8/21/2018  Last Fill Quantity: 40 tabs,  # refills: 11   Last office visit: 7/15/2019 with prescribing provider:  Daniel   Future Office Visit:     40 tablet 11     Sig: TAKE ONE TO FIVE TABLETS BY MOUTH DAILY AS NEEDED.   (DO NOT TAKE WITH NITROGLYCERIN, TERAZOSIN, OR DOXAZOSIN)       Erectile Dysfuction Protocol Failed - 9/5/2019 11:04 AM        Failed - Absence of nitrates on medication list        Passed - Absence of Alpha Blockers on Med list        Passed - Recent (12 mo) or future (30 days) visit within the authorizing provider's specialty     Patient had office visit in the last 12 months or has a visit in the next 30 days with authorizing provider or within the authorizing provider's specialty.  See \"Patient Info\" tab in inbasket, or \"Choose Columns\" in Meds & Orders section of the refill encounter.              Passed - Medication is active on med list        Passed - Patient is age 18 or older          "

## 2019-09-10 NOTE — TELEPHONE ENCOUNTER
LM to return clinic phone call. Patient is due for follow up.    Kandice MATHEW     [employer]

## 2019-09-11 RX ORDER — SILDENAFIL CITRATE 20 MG/1
TABLET ORAL
Qty: 40 TABLET | Refills: 0 | Status: CANCELLED | OUTPATIENT
Start: 2019-09-11

## 2019-09-12 NOTE — TELEPHONE ENCOUNTER
sildenifil is not an every day med nor it is essential. So I don't think a bridge is necessary.    Montrell Sanchez MD

## 2019-09-12 NOTE — TELEPHONE ENCOUNTER
Routing refill request to provider for review/approval because:  Is a bridge appropriate or do you want visit first?

## 2019-09-18 ENCOUNTER — OFFICE VISIT (OUTPATIENT)
Dept: FAMILY MEDICINE | Facility: CLINIC | Age: 54
End: 2019-09-18
Payer: COMMERCIAL

## 2019-09-18 VITALS
SYSTOLIC BLOOD PRESSURE: 100 MMHG | RESPIRATION RATE: 18 BRPM | WEIGHT: 205 LBS | OXYGEN SATURATION: 98 % | BODY MASS INDEX: 29.41 KG/M2 | HEART RATE: 57 BPM | TEMPERATURE: 98.9 F | DIASTOLIC BLOOD PRESSURE: 68 MMHG

## 2019-09-18 DIAGNOSIS — I25.10 CORONARY ARTERY DISEASE INVOLVING NATIVE HEART WITHOUT ANGINA PECTORIS, UNSPECIFIED VESSEL OR LESION TYPE: Primary | ICD-10-CM

## 2019-09-18 DIAGNOSIS — M10.071 ACUTE IDIOPATHIC GOUT OF RIGHT FOOT: ICD-10-CM

## 2019-09-18 DIAGNOSIS — N52.9 ERECTILE DYSFUNCTION, UNSPECIFIED ERECTILE DYSFUNCTION TYPE: ICD-10-CM

## 2019-09-18 DIAGNOSIS — Z11.4 SCREENING FOR HIV (HUMAN IMMUNODEFICIENCY VIRUS): ICD-10-CM

## 2019-09-18 LAB — URATE SERPL-MCNC: 7 MG/DL (ref 3.5–7.2)

## 2019-09-18 PROCEDURE — 84550 ASSAY OF BLOOD/URIC ACID: CPT | Performed by: PHYSICIAN ASSISTANT

## 2019-09-18 PROCEDURE — 99214 OFFICE O/P EST MOD 30 MIN: CPT | Performed by: PHYSICIAN ASSISTANT

## 2019-09-18 PROCEDURE — 87389 HIV-1 AG W/HIV-1&-2 AB AG IA: CPT | Performed by: PHYSICIAN ASSISTANT

## 2019-09-18 PROCEDURE — 36415 COLL VENOUS BLD VENIPUNCTURE: CPT | Performed by: PHYSICIAN ASSISTANT

## 2019-09-18 RX ORDER — LISINOPRIL 5 MG/1
5 TABLET ORAL DAILY
Qty: 90 TABLET | Refills: 3 | Status: SHIPPED | OUTPATIENT
Start: 2019-09-18 | End: 2020-09-03

## 2019-09-18 RX ORDER — PREDNISONE 20 MG/1
20 TABLET ORAL DAILY
Qty: 5 TABLET | Refills: 0 | Status: SHIPPED | OUTPATIENT
Start: 2019-09-18 | End: 2019-09-23

## 2019-09-18 RX ORDER — INDOMETHACIN 50 MG/1
50 CAPSULE ORAL
Qty: 21 CAPSULE | Refills: 0 | Status: CANCELLED | OUTPATIENT
Start: 2019-09-18 | End: 2019-09-25

## 2019-09-18 RX ORDER — SILDENAFIL CITRATE 20 MG/1
TABLET ORAL
Qty: 40 TABLET | Refills: 11 | Status: SHIPPED | OUTPATIENT
Start: 2019-09-18 | End: 2020-09-15

## 2019-09-18 NOTE — PROGRESS NOTES
Subjective     Tip Aguilar is a 54 year old male who presents to clinic today for the following health issues:    HPI   Concerns:  1. Medication refill and follow up. Needs refills of Sildenafil and Lisinopril. Tolerating both without side effects. Has not taken nitroglycerin for years.     2. Gout. Saw podiatry for symptoms. Symptoms came back. He would like some medication for gout flare up. Has been following gout diet. There is pain and redness around right MTP joint. No fevers or chills.       Patient Active Problem List   Diagnosis     Coronary artery disease involving native heart without angina pectoris, unspecified vessel or lesion type     Health Care Home     Past Surgical History:   Procedure Laterality Date     CARDIAC CATHERIZATION  2/13/16    Successful PCI with PAVITHRA placement in the rPLA/distal RCA, mid RCA     VASCULAR SURGERY      Stent replace (coronary)       Social History     Tobacco Use     Smoking status: Never Smoker     Smokeless tobacco: Never Used   Substance Use Topics     Alcohol use: Yes     Alcohol/week: 0.0 oz     Comment: beer or wine  6-10 per week     Family History   Problem Relation Age of Onset     Coronary Artery Disease Father 58        heart valve replacement     Myocardial Infarction Father      Diabetes Mother      Colon Cancer No family hx of          Current Outpatient Medications   Medication Sig Dispense Refill     aspirin 81 MG tablet Take 1 tablet (81 mg) by mouth daily 30 tablet      atorvastatin (LIPITOR) 80 MG tablet Take 1 tablet (80 mg) by mouth At Bedtime 90 tablet 3     Bioflavonoid Products (VITAMIN C) CHEW        lisinopril (PRINIVIL/ZESTRIL) 5 MG tablet Take 1 tablet (5 mg) by mouth daily 90 tablet 3     methylPREDNISolone (MEDROL DOSEPAK) 4 MG tablet therapy pack Follow Package Directions 21 tablet 0     metoprolol tartrate (LOPRESSOR) 25 MG tablet Take 1 tablet (25 mg) by mouth 2 times daily 180 tablet 3     predniSONE (DELTASONE) 20 MG tablet Take 1  tablet (20 mg) by mouth daily for 5 days 5 tablet 0     sildenafil (REVATIO) 20 MG tablet TAKE ONE TO FIVE TABLETS BY MOUTH DAILY AS NEEDED.   (DO NOT TAKE WITH NITROGLYCERIN, TERAZOSIN, OR DOXAZOSIN) 40 tablet 11     vitamin E 400 units TABS Take 400 Units by mouth daily       No Known Allergies  Reviewed and updated as needed this visit by Provider         Review of Systems    ROS: 10 point ROS neg other than the symptoms noted above in the HPI.        Objective    /68 (BP Location: Left arm, Patient Position: Sitting, Cuff Size: Adult Regular)   Pulse 57   Temp 98.9  F (37.2  C) (Oral)   Resp 18   Wt 93 kg (205 lb)   SpO2 98%   BMI 29.41 kg/m    Body mass index is 29.41 kg/m .  Physical Exam   Constitutional: He is oriented to person, place, and time. He appears well-developed and well-nourished. No distress.   HENT:   Head: Normocephalic and atraumatic.   Eyes: Conjunctivae and EOM are normal.   Cardiovascular: Normal rate, regular rhythm and normal heart sounds.   Pulmonary/Chest: Effort normal and breath sounds normal.   Musculoskeletal: Normal range of motion.        Feet:    Neurological: He is alert and oriented to person, place, and time. No sensory deficit.   Skin: Skin is warm and dry. Capillary refill takes less than 2 seconds.   Psychiatric: He has a normal mood and affect. His behavior is normal.   Nursing note and vitals reviewed.       Diagnostic Test Results:  Labs reviewed in Epic  No results found for this or any previous visit (from the past 24 hour(s)).        Assessment & Plan     1. Coronary artery disease involving native heart without angina pectoris, unspecified vessel or lesion type  - lisinopril (PRINIVIL/ZESTRIL) 5 MG tablet; Take 1 tablet (5 mg) by mouth daily  Dispense: 90 tablet; Refill: 3    2. Erectile dysfunction, unspecified erectile dysfunction type  - sildenafil (REVATIO) 20 MG tablet; TAKE ONE TO FIVE TABLETS BY MOUTH DAILY AS NEEDED.   (DO NOT TAKE WITH  "NITROGLYCERIN, TERAZOSIN, OR DOXAZOSIN)  Dispense: 40 tablet; Refill: 11  - Not taking nitroglycerin. Has not taken since having stents put in a few years ago.     3. Acute idiopathic gout of right foot  - Uric acid  - predniSONE (DELTASONE) 20 MG tablet; Take 1 tablet (20 mg) by mouth daily for 5 days  Dispense: 5 tablet; Refill: 0  - Discussed gout diet    4. Screening for HIV (human immunodeficiency virus)  - HIV Antigen Antibody Combo     BMI:   Estimated body mass index is 29.41 kg/m  as calculated from the following:    Height as of 7/15/19: 1.778 m (5' 10\").    Weight as of this encounter: 93 kg (205 lb).   Weight management plan: Discussed healthy diet and exercise guidelines        Patient Instructions       Patient Education     Eating to Prevent Gout  Gout is a painful form of arthritis caused by an excess of uric acid. This is a waste product made by the body. It builds up in the body and forms crystals that collect in the joints, causing a gout attack. Alcohol and certain foods can trigger a gout attack. Below are some guidelines for changing your diet to help you manage gout. Your healthcare provider can work with you to determine the best eating plan for you. Know that diet is only one part of managing gout. Take your medicines as prescribed and follow the other guidelines your healthcare provider has given you.  Foods to limit  Eating too many foods containing purines may increase the levels of uric acid in your body and increase your risk for a gout attack. It may be best to limit these high-purine foods:    Alcohol (beer and red wine). You may be told to avoid alcohol completely.    Certain fish (anchovies, sardines, fish roes, herring, tuna, mussels, codfish, scallops, trout, and mitch)    Certain meats (red meat, processed meat, jorge, turkey, wild game, and goose)    Sauces and gravies made with meat    Organ meats (such as liver, kidneys, sweetbreads, and tripe)    Legumes (such as dried " beans and peas)    Mushrooms, spinach, asparagus, and cauliflower    Yeast and yeast extract supplements  Foods to try  Some foods may be helpful for people with gout. You may want to try adding some of the following foods to your diet:    Dark berries. These include blueberries, blackberries, and cherries. These berries contain chemicals that may lower uric acid.    Tofu. Tofu, which is made from soy, is a good source of protein. Studies have shown that it may be a better choice than meat for people with gout.    Omega fatty acids. These acids are found in fatty fish (such as salmon), certain oils (such as flax, olive, or nut oils), or nuts. They may help prevent inflammation due to gout.  The following guidelines are recommended by the American Medical Association for people with gout. Your diet should be:    High in fiber, whole grains, fruits, and vegetables.    Low in protein (15% of calories should come from protein. Choose lean sources, such as soy, lean meats, and poultry).    Low in fat (no more than 30% of calories should come from fat, with only 10% coming from animal, or saturated, fat).   Date Last Reviewed: 11/1/2017 2000-2018 [employer]. [address] [place] [address], [place] [address]. All rights reserved. This information is not intended as a substitute for professional medical care. Always follow your healthcare professional's instructions.               Return if symptoms worsen or fail to improve.    Max Yates PA-C  [facility]

## 2019-09-18 NOTE — PATIENT INSTRUCTIONS
Patient Education     Eating to Prevent Gout  Gout is a painful form of arthritis caused by an excess of uric acid. This is a waste product made by the body. It builds up in the body and forms crystals that collect in the joints, causing a gout attack. Alcohol and certain foods can trigger a gout attack. Below are some guidelines for changing your diet to help you manage gout. Your healthcare provider can work with you to determine the best eating plan for you. Know that diet is only one part of managing gout. Take your medicines as prescribed and follow the other guidelines your healthcare provider has given you.  Foods to limit  Eating too many foods containing purines may increase the levels of uric acid in your body and increase your risk for a gout attack. It may be best to limit these high-purine foods:    Alcohol (beer and red wine). You may be told to avoid alcohol completely.    Certain fish (anchovies, sardines, fish roes, herring, tuna, mussels, codfish, scallops, trout, and mitch)    Certain meats (red meat, processed meat, jorge, turkey, wild game, and goose)    Sauces and gravies made with meat    Organ meats (such as liver, kidneys, sweetbreads, and tripe)    Legumes (such as dried beans and peas)    Mushrooms, spinach, asparagus, and cauliflower    Yeast and yeast extract supplements  Foods to try  Some foods may be helpful for people with gout. You may want to try adding some of the following foods to your diet:    Dark berries. These include blueberries, blackberries, and cherries. These berries contain chemicals that may lower uric acid.    Tofu. Tofu, which is made from soy, is a good source of protein. Studies have shown that it may be a better choice than meat for people with gout.    Omega fatty acids. These acids are found in fatty fish (such as salmon), certain oils (such as flax, olive, or nut oils), or nuts. They may help prevent inflammation due to gout.  The following guidelines are  recommended by the American Medical Association for people with gout. Your diet should be:    High in fiber, whole grains, fruits, and vegetables.    Low in protein (15% of calories should come from protein. Choose lean sources, such as soy, lean meats, and poultry).    Low in fat (no more than 30% of calories should come from fat, with only 10% coming from animal, or saturated, fat).   Date Last Reviewed: 11/1/2017 2000-2018 [employer]. [address] [place] [address]. All rights reserved. This information is not intended as a substitute for professional medical care. Always follow your healthcare professional's instructions.

## 2019-09-19 LAB — HIV 1+2 AB+HIV1 P24 AG SERPL QL IA: NONREACTIVE

## 2019-09-30 DIAGNOSIS — I25.10 CORONARY ARTERY DISEASE INVOLVING NATIVE HEART WITHOUT ANGINA PECTORIS, UNSPECIFIED VESSEL OR LESION TYPE: ICD-10-CM

## 2019-10-01 NOTE — TELEPHONE ENCOUNTER
"Requested Prescriptions   Pending Prescriptions Disp Refills     metoprolol tartrate (LOPRESSOR) 25 MG tablet [Pharmacy Med Name: METOPROLOL TARTRATE 25MG TABS]  Last Written Prescription Date:  8-21-18  Last Fill Quantity: 180 tab,  # refills: 3   Last office visit: 9/18/2019 with prescribing provider:  Max Yates Office Visit:    180 tablet 3     Sig: TAKE ONE TABLET BY MOUTH TWICE A DAY       Beta-Blockers Protocol Passed - 9/30/2019  7:54 AM        Passed - Blood pressure under 140/90 in past 12 months     BP Readings from Last 3 Encounters:   09/18/19 100/68   07/15/19 106/69   07/15/19 106/69           Passed - Patient is age 6 or older        Passed - Recent (12 mo) or future (30 days) visit within the authorizing provider's specialty     Patient has had an office visit with the authorizing provider or a provider within the authorizing providers department within the previous 12 mos or has a future within next 30 days. See \"Patient Info\" tab in inbasket, or \"Choose Columns\" in Meds & Orders section of the refill encounter.          Passed - Medication is active on med list     ____________________________________         atorvastatin (LIPITOR) 80 MG tablet [Pharmacy Med Name: ATORVASTATIN CALCIUM 80MG TABS]  Last Written Prescription Date:  8-21-18  Last Fill Quantity: 90 tab,  # refills: 3   Last office visit: 9/18/2019 with prescribing provider:  Max Yates Office Visit:    90 tablet 3     Sig: TAKE ONE TABLET BY MOUTH EVERY DAY       Statins Protocol Failed - 9/30/2019  7:54 AM        Failed - LDL on file in past 12 months     Recent Labs   Lab Test 08/21/18  1346   LDL 77           Passed - No abnormal creatine kinase in past 12 months     No lab results found.         Passed - Recent (12 mo) or future (30 days) visit within the authorizing provider's specialty     Patient has had an office visit with the authorizing provider or a provider within the authorizing providers department " "within the previous 12 mos or has a future within next 30 days. See \"Patient Info\" tab in inbasket, or \"Choose Columns\" in Meds & Orders section of the refill encounter.            Passed - Medication is active on med list        Passed - Patient is age 18 or older         "

## 2019-10-03 RX ORDER — METOPROLOL TARTRATE 25 MG/1
TABLET, FILM COATED ORAL
Qty: 180 TABLET | Refills: 3 | Status: SHIPPED | OUTPATIENT
Start: 2019-10-03 | End: 2020-09-15

## 2019-10-03 RX ORDER — ATORVASTATIN CALCIUM 80 MG/1
80 TABLET, FILM COATED ORAL AT BEDTIME
Qty: 90 TABLET | Refills: 3 | Status: SHIPPED | OUTPATIENT
Start: 2019-10-03 | End: 2020-09-15

## 2019-10-03 NOTE — TELEPHONE ENCOUNTER
Routing refill request to provider for review/approval because:  --Per RN protocol patient is due for annual LDL but  has him due in 2023.

## 2020-09-03 DIAGNOSIS — I25.10 CORONARY ARTERY DISEASE INVOLVING NATIVE HEART WITHOUT ANGINA PECTORIS, UNSPECIFIED VESSEL OR LESION TYPE: ICD-10-CM

## 2020-09-03 RX ORDER — LISINOPRIL 5 MG/1
5 TABLET ORAL DAILY
Qty: 90 TABLET | Refills: 0 | Status: SHIPPED | OUTPATIENT
Start: 2020-09-03 | End: 2020-09-15

## 2020-09-03 NOTE — TELEPHONE ENCOUNTER
Patient called to schedule annual on 9/15/20 and will run out of Lisinopril tomorrow requesting enough to get by    Patient will periodically check with pharmacy no need to call back

## 2020-09-15 ENCOUNTER — OFFICE VISIT (OUTPATIENT)
Dept: FAMILY MEDICINE | Facility: CLINIC | Age: 55
End: 2020-09-15
Payer: COMMERCIAL

## 2020-09-15 VITALS
BODY MASS INDEX: 28.92 KG/M2 | HEIGHT: 70 IN | TEMPERATURE: 97.6 F | OXYGEN SATURATION: 96 % | SYSTOLIC BLOOD PRESSURE: 106 MMHG | HEART RATE: 71 BPM | DIASTOLIC BLOOD PRESSURE: 59 MMHG | WEIGHT: 202 LBS

## 2020-09-15 DIAGNOSIS — I25.10 CORONARY ARTERY DISEASE INVOLVING NATIVE HEART WITHOUT ANGINA PECTORIS, UNSPECIFIED VESSEL OR LESION TYPE: ICD-10-CM

## 2020-09-15 DIAGNOSIS — Z13.1 SCREENING FOR DIABETES MELLITUS: ICD-10-CM

## 2020-09-15 DIAGNOSIS — E78.5 HYPERLIPIDEMIA, UNSPECIFIED HYPERLIPIDEMIA TYPE: ICD-10-CM

## 2020-09-15 DIAGNOSIS — Z12.5 SCREENING FOR PROSTATE CANCER: ICD-10-CM

## 2020-09-15 DIAGNOSIS — N52.9 ERECTILE DYSFUNCTION, UNSPECIFIED ERECTILE DYSFUNCTION TYPE: ICD-10-CM

## 2020-09-15 DIAGNOSIS — I10 ESSENTIAL HYPERTENSION: ICD-10-CM

## 2020-09-15 DIAGNOSIS — Z00.00 ROUTINE GENERAL MEDICAL EXAMINATION AT A HEALTH CARE FACILITY: Primary | ICD-10-CM

## 2020-09-15 DIAGNOSIS — Z12.11 COLON CANCER SCREENING: ICD-10-CM

## 2020-09-15 DIAGNOSIS — I77.810 ASCENDING AORTA DILATION (H): ICD-10-CM

## 2020-09-15 LAB — HBA1C MFR BLD: 5.5 % (ref 0–5.6)

## 2020-09-15 PROCEDURE — 80053 COMPREHEN METABOLIC PANEL: CPT | Performed by: FAMILY MEDICINE

## 2020-09-15 PROCEDURE — 83036 HEMOGLOBIN GLYCOSYLATED A1C: CPT | Performed by: FAMILY MEDICINE

## 2020-09-15 PROCEDURE — G0103 PSA SCREENING: HCPCS | Performed by: FAMILY MEDICINE

## 2020-09-15 PROCEDURE — 36415 COLL VENOUS BLD VENIPUNCTURE: CPT | Performed by: FAMILY MEDICINE

## 2020-09-15 PROCEDURE — 99396 PREV VISIT EST AGE 40-64: CPT | Performed by: FAMILY MEDICINE

## 2020-09-15 PROCEDURE — 82043 UR ALBUMIN QUANTITATIVE: CPT | Performed by: FAMILY MEDICINE

## 2020-09-15 PROCEDURE — 80061 LIPID PANEL: CPT | Performed by: FAMILY MEDICINE

## 2020-09-15 RX ORDER — ATORVASTATIN CALCIUM 80 MG/1
80 TABLET, FILM COATED ORAL AT BEDTIME
Qty: 90 TABLET | Refills: 3 | Status: SHIPPED | OUTPATIENT
Start: 2020-09-15 | End: 2020-09-15

## 2020-09-15 RX ORDER — SILDENAFIL CITRATE 20 MG/1
TABLET ORAL
Qty: 40 TABLET | Refills: 11 | Status: SHIPPED | OUTPATIENT
Start: 2020-09-15 | End: 2021-09-20

## 2020-09-15 RX ORDER — METOPROLOL TARTRATE 25 MG/1
25 TABLET, FILM COATED ORAL 2 TIMES DAILY
Qty: 180 TABLET | Refills: 3 | Status: SHIPPED | OUTPATIENT
Start: 2020-09-15 | End: 2020-09-15

## 2020-09-15 RX ORDER — METOPROLOL TARTRATE 25 MG/1
25 TABLET, FILM COATED ORAL 2 TIMES DAILY
Qty: 180 TABLET | Refills: 3 | Status: SHIPPED | OUTPATIENT
Start: 2020-09-15 | End: 2021-09-20

## 2020-09-15 RX ORDER — ATORVASTATIN CALCIUM 80 MG/1
80 TABLET, FILM COATED ORAL AT BEDTIME
Qty: 90 TABLET | Refills: 3 | Status: SHIPPED | OUTPATIENT
Start: 2020-09-15 | End: 2021-10-08

## 2020-09-15 RX ORDER — LISINOPRIL 5 MG/1
5 TABLET ORAL DAILY
Qty: 90 TABLET | Refills: 3 | Status: SHIPPED | OUTPATIENT
Start: 2020-09-15 | End: 2021-10-08

## 2020-09-15 RX ORDER — LISINOPRIL 5 MG/1
5 TABLET ORAL DAILY
Qty: 90 TABLET | Refills: 0 | Status: SHIPPED | OUTPATIENT
Start: 2020-09-15 | End: 2020-09-15

## 2020-09-15 ASSESSMENT — ENCOUNTER SYMPTOMS
COUGH: 0
FEVER: 0
CONSTIPATION: 0
DIZZINESS: 0
NERVOUS/ANXIOUS: 0
DIARRHEA: 0
HEMATOCHEZIA: 0
FREQUENCY: 0
HEMATURIA: 0
EYE PAIN: 0
ABDOMINAL PAIN: 0
CHILLS: 0

## 2020-09-15 ASSESSMENT — MIFFLIN-ST. JEOR: SCORE: 1759.58

## 2020-09-15 NOTE — LETTER
September 25, 2020      Tip Aguilar  [address] [place] [address]        Dear ,    We are writing to inform you of your results for your recent labs. They are reassuring and continue the current medication dose. Please call or message me if you have questions or concerns.    Resulted Orders   Comprehensive metabolic panel (BMP + Alb, Alk Phos, ALT, AST, Total. Bili, TP)   Result Value Ref Range    Sodium 137 133 - 144 mmol/L    Potassium 3.4 3.4 - 5.3 mmol/L    Chloride 105 94 - 109 mmol/L    Carbon Dioxide 26 20 - 32 mmol/L    Anion Gap 6 3 - 14 mmol/L    Glucose 136 (H) 70 - 99 mg/dL    Urea Nitrogen 15 7 - 30 mg/dL    Creatinine 0.88 0.66 - 1.25 mg/dL    GFR Estimate >90 >60 mL/min/[1.73_m2]      Comment:      Non  GFR Calc  Starting 12/18/2018, serum creatinine based estimated GFR (eGFR) will be   calculated using the Chronic Kidney Disease Epidemiology Collaboration   (CKD-EPI) equation.      GFR Estimate If Black >90 >60 mL/min/[1.73_m2]      Comment:       GFR Calc  Starting 12/18/2018, serum creatinine based estimated GFR (eGFR) will be   calculated using the Chronic Kidney Disease Epidemiology Collaboration   (CKD-EPI) equation.      Calcium 9.3 8.5 - 10.1 mg/dL    Bilirubin Total 0.7 0.2 - 1.3 mg/dL    Albumin 4.2 3.4 - 5.0 g/dL    Protein Total 7.4 6.8 - 8.8 g/dL    Alkaline Phosphatase 54 40 - 150 U/L    ALT 33 0 - 70 U/L    AST 18 0 - 45 U/L   Albumin Random Urine Quantitative with Creat Ratio   Result Value Ref Range    Creatinine Urine 57 mg/dL    Albumin Urine mg/L <5 mg/L    Albumin Urine mg/g Cr Unable to calculate due to low value 0 - 17 mg/g Cr   Lipid Profile (Chol, Trig, HDL, LDL calc)   Result Value Ref Range    Cholesterol 169 <200 mg/dL    Triglycerides 246 (H) <150 mg/dL      Comment:      Borderline high:  150-199 mg/dl  High:             200-499 mg/dl  Very high:       >499 mg/dl      HDL Cholesterol 38 (L) >39 mg/dL    LDL  Cholesterol Calculated 82 <100 mg/dL      Comment:      Desirable:       <100 mg/dl    Non HDL Cholesterol 131 (H) <130 mg/dL      Comment:      Above Desirable:  130-159 mg/dl  Borderline high:  160-189 mg/dl  High:             190-219 mg/dl  Very high:       >219 mg/dl     Hemoglobin A1c   Result Value Ref Range    Hemoglobin A1C 5.5 0 - 5.6 %      Comment:      Normal <5.7% Prediabetes 5.7-6.4%  Diabetes 6.5% or higher - adopted from ADA   consensus guidelines.     PSA, screen   Result Value Ref Range    PSA 1.37 0 - 4 ug/L      Comment:      Assay Method:  Chemiluminescence using Siemens Vista analyzer       If you have any questions or concerns, please call the clinic at the number listed above.       Sincerely,        Scott Dunham MD

## 2020-09-15 NOTE — PROGRESS NOTES
SUBJECTIVE:   CC: Tip Aguilar is an 55 year old male who presents for preventative health visit.       Patient has been advised of split billing requirements and indicates understanding: Yes  Healthy Habits:     Getting at least 3 servings of Calcium per day:  Yes    Bi-annual eye exam:  Yes    Dental care twice a year:  NO    Sleep apnea or symptoms of sleep apnea:  None    Diet:  Regular (no restrictions)    Frequency of exercise:  4-5 days/week    Duration of exercise:  15-30 minutes    Taking medications regularly:  Yes    Medication side effects:  None    PHQ-2 Total Score: 0    Additional concerns today:  No      Declined flu shot, shingles and pneumonia vaccine       Today's PHQ-2 Score:   PHQ-2 ( 1999 [employer]) 9/15/2020   Q1: Little interest or pleasure in doing things 0   Q2: Feeling down, depressed or hopeless 0   PHQ-2 Score 0   Q1: Little interest or pleasure in doing things Not at all   Q2: Feeling down, depressed or hopeless Not at all   PHQ-2 Score 0       Abuse: Current or Past(Physical, Sexual or Emotional)- No  Do you feel safe in your environment? Yes        Social History     Tobacco Use     Smoking status: Never Smoker     Smokeless tobacco: Never Used   Substance Use Topics     Alcohol use: Yes     Alcohol/week: 0.0 standard drinks     Comment: beer or wine  6-10 per week     If you drink alcohol do you typically have >3 drinks per day or >7 drinks per week? No    Alcohol Use 9/15/2020   Prescreen: >3 drinks/day or >7 drinks/week? No   Prescreen: >3 drinks/day or >7 drinks/week? -       Last PSA: No results found for: PSA    Reviewed orders with patient. Reviewed health maintenance and updated orders accordingly - Yes    Reviewed and updated as needed this visit by clinical staff         Reviewed and updated as needed this visit by Provider          Review of Systems   Constitutional: Negative for chills and fever.   HENT: Negative for congestion and ear pain.    Eyes: Negative for pain.  "  Respiratory: Negative for cough.    Cardiovascular: Negative for chest pain.   Gastrointestinal: Negative for abdominal pain, constipation, diarrhea and hematochezia.   Genitourinary: Negative for frequency and hematuria.   Neurological: Negative for dizziness.   Psychiatric/Behavioral: The patient is not nervous/anxious.        OBJECTIVE:   /59   Pulse 71   Temp 97.6  F (36.4  C) (Oral)   Ht 1.765 m (5' 9.5\")   Wt 91.6 kg (202 lb)   SpO2 96%   BMI 29.40 kg/m    Physical Exam  GENERAL: healthy, alert and no distress  EYES: Eyes grossly normal to inspection, PERRL and conjunctivae and sclerae normal  HENT: ear canals and TM's normal, nose and mouth without ulcers or lesions  NECK: no adenopathy, no asymmetry, masses, or scars and thyroid normal to palpation  RESP: lungs clear to auscultation - no rales, rhonchi or wheezes  CV: regular rate and rhythm, normal S1 S2  ABDOMEN: soft, nontender, no hepatosplenomegaly, no masses and bowel sounds normal  MS: no gross musculoskeletal defects noted, no edema  SKIN: no suspicious lesions or rashes  NEURO: Normal strength and tone, mentation intact and speech normal  PSYCH: mentation appears normal, affect normal/bright    Diagnostic Test Results:  Labs reviewed in Epic    ASSESSMENT/PLAN:       Routine general medical examination at a health care facility  - declined immunizations    Coronary artery disease involving native heart without angina pectoris, unspecified vessel or lesion type, ascending aorta dilation   - Comprehensive metabolic panel (BMP + Alb, Alk Phos, ALT, AST, Total. Bili, TP)  - Albumin Random Urine Quantitative with Creat Ratio  - Lipid Profile (Chol, Trig, HDL, LDL calc)  - CARDIOLOGY EVAL ADULT REFERRAL; Future - referred last visit 06/2018   - metoprolol tartrate (LOPRESSOR) 25 MG tablet; Take 1 tablet (25 mg) by mouth 2 times daily  Dispense: 180 tablet; Refill: 3  - lisinopril (ZESTRIL) 5 MG tablet; Take 1 tablet (5 mg) by mouth daily  " "Dispense: 90 tablet; Refill: 3  - atorvastatin (LIPITOR) 80 MG tablet; Take 1 tablet (80 mg) by mouth At Bedtime  Dispense: 90 tablet; Refill: 3    Erectile dysfunction, unspecified erectile dysfunction type  - sildenafil (REVATIO) 20 MG tablet; TAKE ONE TO FIVE TABLETS BY MOUTH DAILY AS NEEDED.   (DO NOT TAKE WITH NITROGLYCERIN, TERAZOSIN, OR DOXAZOSIN)  Dispense: 40 tablet; Refill: 11    Screening for diabetes mellitus  - Hemoglobin A1c    Hyperlipidemia, unspecified hyperlipidemia type  - Lipid Profile (Chol, Trig, HDL, LDL calc)  - atorvastatin (LIPITOR) 80 MG tablet; Take 1 tablet (80 mg) by mouth At Bedtime  Dispense: 90 tablet; Refill: 3    Essential hypertension  - lab only visit in 6 months, routine yearly visit   - Comprehensive metabolic panel (BMP + Alb, Alk Phos, ALT, AST, Total. Bili, TP)  - Albumin Random Urine Quantitative with Creat Ratio  - metoprolol tartrate (LOPRESSOR) 25 MG tablet; Take 1 tablet (25 mg) by mouth 2 times daily  Dispense: 180 tablet; Refill: 3    Colon cancer screening  - GASTROENTEROLOGY ADULT REF PROCEDURE ONLY; Future      Patient has been advised of split billing requirements and indicates understanding: Yes  COUNSELING:   Reviewed preventive health counseling, as reflected in patient instructions    Estimated body mass index is 29.41 kg/m  as calculated from the following:    Height as of 7/15/19: 1.778 m (5' 10\").    Weight as of 9/18/19: 93 kg (205 lb).     Weight management plan: Discussed healthy diet and exercise guidelines    He reports that he has never smoked. He has never used smokeless tobacco.      Counseling Resources:  ATP IV Guidelines  Pooled Cohorts Equation Calculator  FRAX Risk Assessment  ICSI Preventive Guidelines  Dietary Guidelines for Americans, 2010  USDA's MyPlate  ASA Prophylaxis  Lung CA Screening    Deqa Zenaida Dunham MD  [facility]  "

## 2020-09-16 LAB
ALBUMIN SERPL-MCNC: 4.2 G/DL (ref 3.4–5)
ALP SERPL-CCNC: 54 U/L (ref 40–150)
ALT SERPL W P-5'-P-CCNC: 33 U/L (ref 0–70)
ANION GAP SERPL CALCULATED.3IONS-SCNC: 6 MMOL/L (ref 3–14)
AST SERPL W P-5'-P-CCNC: 18 U/L (ref 0–45)
BILIRUB SERPL-MCNC: 0.7 MG/DL (ref 0.2–1.3)
BUN SERPL-MCNC: 15 MG/DL (ref 7–30)
CALCIUM SERPL-MCNC: 9.3 MG/DL (ref 8.5–10.1)
CHLORIDE SERPL-SCNC: 105 MMOL/L (ref 94–109)
CHOLEST SERPL-MCNC: 169 MG/DL
CO2 SERPL-SCNC: 26 MMOL/L (ref 20–32)
CREAT SERPL-MCNC: 0.88 MG/DL (ref 0.66–1.25)
CREAT UR-MCNC: 57 MG/DL
GFR SERPL CREATININE-BSD FRML MDRD: >90 ML/MIN/{1.73_M2}
GLUCOSE SERPL-MCNC: 136 MG/DL (ref 70–99)
HDLC SERPL-MCNC: 38 MG/DL
LDLC SERPL CALC-MCNC: 82 MG/DL
MICROALBUMIN UR-MCNC: <5 MG/L
MICROALBUMIN/CREAT UR: NORMAL MG/G CR (ref 0–17)
NONHDLC SERPL-MCNC: 131 MG/DL
POTASSIUM SERPL-SCNC: 3.4 MMOL/L (ref 3.4–5.3)
PROT SERPL-MCNC: 7.4 G/DL (ref 6.8–8.8)
PSA SERPL-ACNC: 1.37 UG/L (ref 0–4)
SODIUM SERPL-SCNC: 137 MMOL/L (ref 133–144)
TRIGL SERPL-MCNC: 246 MG/DL

## 2020-12-22 DIAGNOSIS — N52.9 ERECTILE DYSFUNCTION, UNSPECIFIED ERECTILE DYSFUNCTION TYPE: ICD-10-CM

## 2020-12-23 RX ORDER — SILDENAFIL CITRATE 20 MG/1
TABLET ORAL
Qty: 40 TABLET | Refills: 0 | OUTPATIENT
Start: 2020-12-23

## 2021-02-15 DIAGNOSIS — I77.810 ASCENDING AORTA DILATION (H): ICD-10-CM

## 2021-02-15 DIAGNOSIS — I25.10 CORONARY ARTERY DISEASE INVOLVING NATIVE HEART WITHOUT ANGINA PECTORIS, UNSPECIFIED VESSEL OR LESION TYPE: Primary | ICD-10-CM

## 2021-02-25 ENCOUNTER — HOSPITAL ENCOUNTER (OUTPATIENT)
Dept: CARDIOLOGY | Facility: CLINIC | Age: 56
Discharge: HOME | End: 2021-02-25
Attending: INTERNAL MEDICINE | Admitting: INTERNAL MEDICINE
Payer: COMMERCIAL

## 2021-02-25 DIAGNOSIS — I25.10 CORONARY ARTERY DISEASE INVOLVING NATIVE HEART WITHOUT ANGINA PECTORIS, UNSPECIFIED VESSEL OR LESION TYPE: ICD-10-CM

## 2021-02-25 DIAGNOSIS — I77.810 ASCENDING AORTA DILATION (H): ICD-10-CM

## 2021-02-25 PROCEDURE — 93306 TTE W/DOPPLER COMPLETE: CPT

## 2021-02-25 PROCEDURE — 93306 TTE W/DOPPLER COMPLETE: CPT | Mod: 26 | Performed by: INTERNAL MEDICINE

## 2021-03-03 ENCOUNTER — OFFICE VISIT (OUTPATIENT)
Dept: CARDIOLOGY | Facility: CLINIC | Age: 56
End: 2021-03-03
Payer: COMMERCIAL

## 2021-03-03 VITALS
SYSTOLIC BLOOD PRESSURE: 107 MMHG | OXYGEN SATURATION: 97 % | HEART RATE: 49 BPM | WEIGHT: 206 LBS | DIASTOLIC BLOOD PRESSURE: 66 MMHG | BODY MASS INDEX: 29.98 KG/M2

## 2021-03-03 DIAGNOSIS — I25.10 CORONARY ARTERY DISEASE INVOLVING NATIVE HEART WITHOUT ANGINA PECTORIS, UNSPECIFIED VESSEL OR LESION TYPE: Primary | ICD-10-CM

## 2021-03-03 PROCEDURE — 99214 OFFICE O/P EST MOD 30 MIN: CPT | Performed by: INTERNAL MEDICINE

## 2021-03-03 NOTE — LETTER
3/3/2021    Montrell Sanchez MD  [address]  [facility] [address]    RE: Tip Aguilar       Dear Colleague,    I had the pleasure of seeing Tip Aguilar in the [facility].    HPI and Plan:      REASON FOR VISIT:  Followup for coronary artery disease.      HISTORY OF PRESENT ILLNESS:  I had the pleasure of seeing Tip Aguilar at the [facility] in [place] this morning.  He is a very pleasant 55-year-old gentleman with known coronary artery disease and hyperlipidemia.  He suffered an inferior ST elevation MI in 02/2016.  Coronary angiogram at that time demonstrated an occluded large right posterolateral branch and moderate severe mid RCA stenosis.  He had no significant disease elsewhere.  He subsequently underwent successful PCI with drug-eluting stent placement in the right posterolateral branch and the mid RCA.      He has done quite well from a cardiac point of view since then. I have reviewed the patient's recent echocardiogram which was obtained last week. This demonstrated normal LV function and borderline aortic root dilatation. I also reviewed his most recent lipid profile and numbers are satisfactory      IMPRESSION, REPORT, PLAN:   1.  Coronary artery disease.   2.  Status post prior inferior myocardial infarction and stenting of the mid RCA and right posterolateral branch.   3.  Hyperlipidemia   4.  Hypertension.      Mr. Aguilar continues to do well from a cardiac point of view.  He currently denies any exertional chest pain or shortness of breath.  His risk factors are well controlled with the current medical program.      I recommended that he continue his current medical program.  We will see him in approximately 2 years for followup but sooner if he develops symptoms.      I appreciate the opportunity to be part of this patient's care.         SHRUTI DE LA VEGA MD        Orders Placed This Encounter   Procedures      Follow-Up with Cardiologist       No orders of the defined types were placed in this encounter.      There are no discontinued medications.      Encounter Diagnosis   Name Primary?     Coronary artery disease involving native heart without angina pectoris, unspecified vessel or lesion type Yes       CURRENT MEDICATIONS:  Current Outpatient Medications   Medication Sig Dispense Refill     aspirin 81 MG tablet Take 1 tablet (81 mg) by mouth daily 30 tablet      atorvastatin (LIPITOR) 80 MG tablet Take 1 tablet (80 mg) by mouth At Bedtime 90 tablet 3     Bioflavonoid Products (VITAMIN C) CHEW        lisinopril (ZESTRIL) 5 MG tablet Take 1 tablet (5 mg) by mouth daily 90 tablet 3     metoprolol tartrate (LOPRESSOR) 25 MG tablet Take 1 tablet (25 mg) by mouth 2 times daily 180 tablet 3     sildenafil (REVATIO) 20 MG tablet TAKE ONE TO FIVE TABLETS BY MOUTH DAILY AS NEEDED.   (DO NOT TAKE WITH NITROGLYCERIN, TERAZOSIN, OR DOXAZOSIN) 40 tablet 11     vitamin E 400 units TABS Take 400 Units by mouth daily         ALLERGIES   No Known Allergies    PAST MEDICAL HISTORY:  Past Medical History:   Diagnosis Date     Ascending aorta dilation (H) 09/15/2020     ASCVD (arteriosclerotic cardiovascular disease)      CAD (coronary artery disease) 02/2016     ED (erectile dysfunction)      HLD (hyperlipidemia)      HTN (hypertension)      STEMI (ST elevation myocardial infarction) (H) 02/2016    s/p PAVITHRA to right posterolateral branch and mid RCA       PAST SURGICAL HISTORY:  Past Surgical History:   Procedure Laterality Date     CARDIAC CATHERIZATION  2/13/16    Successful PCI with PAVITHRA placement in the rPLA/distal RCA, mid RCA     VASCULAR SURGERY      Stent replace (coronary)       FAMILY HISTORY:  Family History   Problem Relation Age of Onset     Coronary Artery Disease Father 58        heart valve replacement     Myocardial Infarction Father      Diabetes Mother      Colon Cancer No family hx of        SOCIAL HISTORY:  Social  "History     Socioeconomic History     Marital status: Single     Spouse name: None     Number of children: None     Years of education: None     Highest education level: None   Occupational History     None   Social Needs     Financial resource strain: None     Food insecurity     Worry: None     Inability: None     Transportation needs     Medical: None     Non-medical: None   Tobacco Use     Smoking status: Never Smoker     Smokeless tobacco: Never Used   Substance and Sexual Activity     Alcohol use: Yes     Alcohol/week: 0.0 standard drinks     Comment: beer or wine  6-10 per week     Drug use: No     Sexual activity: Yes     Partners: Female     Birth control/protection: Surgical   Lifestyle     Physical activity     Days per week: None     Minutes per session: None     Stress: None   Relationships     Social connections     Talks on phone: None     Gets together: None     Attends Latter-day service: None     Active member of club or organization: None     Attends meetings of clubs or organizations: None     Relationship status: None     Intimate partner violence     Fear of current or ex partner: None     Emotionally abused: None     Physically abused: None     Forced sexual activity: None   Other Topics Concern     Parent/sibling w/ CABG, MI or angioplasty before 65F 55M? No      Service Not Asked     Blood Transfusions Not Asked     Caffeine Concern Yes     Comment: coffee in the am     Occupational Exposure Not Asked     Hobby Hazards Not Asked     Sleep Concern No     Comment: since surgery been sleepy \"Ok\"     Stress Concern Yes     Comment: self employed     Weight Concern Not Asked     Special Diet Not Asked     Back Care Not Asked     Exercise Yes     Comment: uses treadmill daily     Bike Helmet Not Asked     Seat Belt Not Asked     Self-Exams Not Asked   Social History Narrative     None       Review of Systems:  Skin:  Negative       Eyes:  Positive for glasses    ENT:  Negative    "   Respiratory:  Negative       Cardiovascular:  Negative      Gastroenterology: Negative      Genitourinary:  Negative      Musculoskeletal:  Negative      Neurologic:  Negative      Psychiatric:  Negative (related to being self employed)    Heme/Lymph/Imm:  Negative      Endocrine:  Negative        Physical Exam:  Vitals: /66   Pulse (!) 49   Wt 93.4 kg (206 lb)   SpO2 97%   BMI 29.98 kg/m      Constitutional:  cooperative;in no acute distress        Skin:  warm and dry to the touch;no apparent skin lesions or masses noted          Head:  normocephalic        Eyes:           Lymph:      ENT:  no pallor or cyanosis        Neck:  carotid pulses are full and equal bilaterally;JVP normal        Respiratory:  clear to auscultation         Cardiac: regular rhythm;normal S1 and S2;no murmurs, gallops or rubs detected     no presence of murmur          pulses full and equal                                        GI:  abdomen soft;no masses;no bruits        Extremities and Muscular Skeletal:  no edema;normal muscle strength and tone              Neurological:  no gross motor deficits        Psych:  Alert and Oriented x 3        Thank you for allowing me to participate in the care of your patient.      Sincerely,     Merrick Plaza MD, MD     [facility]    cc:   Scott Dunham MD  [address] [place] [address]

## 2021-03-03 NOTE — PROGRESS NOTES
HPI and Plan:      REASON FOR VISIT:  Followup for coronary artery disease.      HISTORY OF PRESENT ILLNESS:  I had the pleasure of seeing Tip Aguilar at the [facility] in [place] this morning.  He is a very pleasant 55-year-old gentleman with known coronary artery disease and hyperlipidemia.  He suffered an inferior ST elevation MI in 02/2016.  Coronary angiogram at that time demonstrated an occluded large right posterolateral branch and moderate severe mid RCA stenosis.  He had no significant disease elsewhere.  He subsequently underwent successful PCI with drug-eluting stent placement in the right posterolateral branch and the mid RCA.      He has done quite well from a cardiac point of view since then. I have reviewed the patient's recent echocardiogram which was obtained last week. This demonstrated normal LV function and borderline aortic root dilatation. I also reviewed his most recent lipid profile and numbers are satisfactory      IMPRESSION, REPORT, PLAN:   1.  Coronary artery disease.   2.  Status post prior inferior myocardial infarction and stenting of the mid RCA and right posterolateral branch.   3.  Hyperlipidemia   4.  Hypertension.      Mr. Aguilar continues to do well from a cardiac point of view.  He currently denies any exertional chest pain or shortness of breath.  His risk factors are well controlled with the current medical program.      I recommended that he continue his current medical program.  We will see him in approximately 2 years for followup but sooner if he develops symptoms.      I appreciate the opportunity to be part of this patient's care.         SHRUTI DE LA VEGA MD        Orders Placed This Encounter   Procedures     Follow-Up with Cardiologist       No orders of the defined types were placed in this encounter.      There are no discontinued medications.      Encounter Diagnosis   Name Primary?     Coronary artery disease involving native heart without  angina pectoris, unspecified vessel or lesion type Yes       CURRENT MEDICATIONS:  Current Outpatient Medications   Medication Sig Dispense Refill     aspirin 81 MG tablet Take 1 tablet (81 mg) by mouth daily 30 tablet      atorvastatin (LIPITOR) 80 MG tablet Take 1 tablet (80 mg) by mouth At Bedtime 90 tablet 3     Bioflavonoid Products (VITAMIN C) CHEW        lisinopril (ZESTRIL) 5 MG tablet Take 1 tablet (5 mg) by mouth daily 90 tablet 3     metoprolol tartrate (LOPRESSOR) 25 MG tablet Take 1 tablet (25 mg) by mouth 2 times daily 180 tablet 3     sildenafil (REVATIO) 20 MG tablet TAKE ONE TO FIVE TABLETS BY MOUTH DAILY AS NEEDED.   (DO NOT TAKE WITH NITROGLYCERIN, TERAZOSIN, OR DOXAZOSIN) 40 tablet 11     vitamin E 400 units TABS Take 400 Units by mouth daily         ALLERGIES   No Known Allergies    PAST MEDICAL HISTORY:  Past Medical History:   Diagnosis Date     Ascending aorta dilation (H) 09/15/2020     ASCVD (arteriosclerotic cardiovascular disease)      CAD (coronary artery disease) 02/2016     ED (erectile dysfunction)      HLD (hyperlipidemia)      HTN (hypertension)      STEMI (ST elevation myocardial infarction) (H) 02/2016    s/p PAVITHRA to right posterolateral branch and mid RCA       PAST SURGICAL HISTORY:  Past Surgical History:   Procedure Laterality Date     CARDIAC CATHERIZATION  2/13/16    Successful PCI with PAVITHRA placement in the rPLA/distal RCA, mid RCA     VASCULAR SURGERY      Stent replace (coronary)       FAMILY HISTORY:  Family History   Problem Relation Age of Onset     Coronary Artery Disease Father 58        heart valve replacement     Myocardial Infarction Father      Diabetes Mother      Colon Cancer No family hx of        SOCIAL HISTORY:  Social History     Socioeconomic History     Marital status: Single     Spouse name: None     Number of children: None     Years of education: None     Highest education level: None   Occupational History     None   Social Needs     Financial resource  "strain: None     Food insecurity     Worry: None     Inability: None     Transportation needs     Medical: None     Non-medical: None   Tobacco Use     Smoking status: Never Smoker     Smokeless tobacco: Never Used   Substance and Sexual Activity     Alcohol use: Yes     Alcohol/week: 0.0 standard drinks     Comment: beer or wine  6-10 per week     Drug use: No     Sexual activity: Yes     Partners: Female     Birth control/protection: Surgical   Lifestyle     Physical activity     Days per week: None     Minutes per session: None     Stress: None   Relationships     Social connections     Talks on phone: None     Gets together: None     Attends Gnosticist service: None     Active member of club or organization: None     Attends meetings of clubs or organizations: None     Relationship status: None     Intimate partner violence     Fear of current or ex partner: None     Emotionally abused: None     Physically abused: None     Forced sexual activity: None   Other Topics Concern     Parent/sibling w/ CABG, MI or angioplasty before 65F 55M? No      Service Not Asked     Blood Transfusions Not Asked     Caffeine Concern Yes     Comment: coffee in the am     Occupational Exposure Not Asked     Hobby Hazards Not Asked     Sleep Concern No     Comment: since surgery been sleepy \"Ok\"     Stress Concern Yes     Comment: self employed     Weight Concern Not Asked     Special Diet Not Asked     Back Care Not Asked     Exercise Yes     Comment: uses treadmill daily     Bike Helmet Not Asked     Seat Belt Not Asked     Self-Exams Not Asked   Social History Narrative     None       Review of Systems:  Skin:  Negative       Eyes:  Positive for glasses    ENT:  Negative      Respiratory:  Negative       Cardiovascular:  Negative      Gastroenterology: Negative      Genitourinary:  Negative      Musculoskeletal:  Negative      Neurologic:  Negative      Psychiatric:  Negative (related to being self employed)  "   Heme/Lymph/Imm:  Negative      Endocrine:  Negative        Physical Exam:  Vitals: /66   Pulse (!) 49   Wt 93.4 kg (206 lb)   SpO2 97%   BMI 29.98 kg/m      Constitutional:  cooperative;in no acute distress        Skin:  warm and dry to the touch;no apparent skin lesions or masses noted          Head:  normocephalic        Eyes:           Lymph:      ENT:  no pallor or cyanosis        Neck:  carotid pulses are full and equal bilaterally;JVP normal        Respiratory:  clear to auscultation         Cardiac: regular rhythm;normal S1 and S2;no murmurs, gallops or rubs detected     no presence of murmur          pulses full and equal                                        GI:  abdomen soft;no masses;no bruits        Extremities and Muscular Skeletal:  no edema;normal muscle strength and tone              Neurological:  no gross motor deficits        Psych:  Alert and Oriented x 3        CC  Scott Dunham MD  [address] [place] [address]

## 2021-10-08 ENCOUNTER — OFFICE VISIT (OUTPATIENT)
Dept: FAMILY MEDICINE | Facility: CLINIC | Age: 56
End: 2021-10-08
Payer: COMMERCIAL

## 2021-10-08 VITALS
DIASTOLIC BLOOD PRESSURE: 70 MMHG | RESPIRATION RATE: 16 BRPM | BODY MASS INDEX: 29.47 KG/M2 | TEMPERATURE: 98 F | OXYGEN SATURATION: 96 % | WEIGHT: 199 LBS | HEART RATE: 54 BPM | HEIGHT: 69 IN | SYSTOLIC BLOOD PRESSURE: 109 MMHG

## 2021-10-08 DIAGNOSIS — I10 ESSENTIAL HYPERTENSION: ICD-10-CM

## 2021-10-08 DIAGNOSIS — Z00.00 ROUTINE GENERAL MEDICAL EXAMINATION AT A HEALTH CARE FACILITY: Primary | ICD-10-CM

## 2021-10-08 DIAGNOSIS — E78.5 HYPERLIPIDEMIA, UNSPECIFIED HYPERLIPIDEMIA TYPE: ICD-10-CM

## 2021-10-08 DIAGNOSIS — I25.10 CORONARY ARTERY DISEASE INVOLVING NATIVE HEART WITHOUT ANGINA PECTORIS, UNSPECIFIED VESSEL OR LESION TYPE: ICD-10-CM

## 2021-10-08 DIAGNOSIS — N52.9 ERECTILE DYSFUNCTION, UNSPECIFIED ERECTILE DYSFUNCTION TYPE: ICD-10-CM

## 2021-10-08 DIAGNOSIS — Z12.11 COLON CANCER SCREENING: ICD-10-CM

## 2021-10-08 DIAGNOSIS — Z13.1 SCREENING FOR DIABETES MELLITUS: ICD-10-CM

## 2021-10-08 DIAGNOSIS — Z11.59 ENCOUNTER FOR HEPATITIS C SCREENING TEST FOR LOW RISK PATIENT: ICD-10-CM

## 2021-10-08 LAB
ANION GAP SERPL CALCULATED.3IONS-SCNC: 6 MMOL/L (ref 3–14)
BUN SERPL-MCNC: 20 MG/DL (ref 7–30)
CALCIUM SERPL-MCNC: 9.4 MG/DL (ref 8.5–10.1)
CHLORIDE BLD-SCNC: 106 MMOL/L (ref 94–109)
CHOLEST SERPL-MCNC: 157 MG/DL
CO2 SERPL-SCNC: 25 MMOL/L (ref 20–32)
CREAT SERPL-MCNC: 0.9 MG/DL (ref 0.66–1.25)
ERYTHROCYTE [DISTWIDTH] IN BLOOD BY AUTOMATED COUNT: 12.9 % (ref 10–15)
FASTING STATUS PATIENT QL REPORTED: YES
GFR SERPL CREATININE-BSD FRML MDRD: >90 ML/MIN/1.73M2
GLUCOSE BLD-MCNC: 114 MG/DL (ref 70–99)
HBA1C MFR BLD: 5.7 % (ref 0–5.6)
HCT VFR BLD AUTO: 46.3 % (ref 40–53)
HCV AB SERPL QL IA: NONREACTIVE
HDLC SERPL-MCNC: 36 MG/DL
HGB BLD-MCNC: 15.2 G/DL (ref 13.3–17.7)
LDLC SERPL CALC-MCNC: 94 MG/DL
MCH RBC QN AUTO: 29.2 PG (ref 26.5–33)
MCHC RBC AUTO-ENTMCNC: 32.8 G/DL (ref 31.5–36.5)
MCV RBC AUTO: 89 FL (ref 78–100)
NONHDLC SERPL-MCNC: 121 MG/DL
PLATELET # BLD AUTO: 240 10E3/UL (ref 150–450)
POTASSIUM BLD-SCNC: 4.2 MMOL/L (ref 3.4–5.3)
RBC # BLD AUTO: 5.2 10E6/UL (ref 4.4–5.9)
SODIUM SERPL-SCNC: 137 MMOL/L (ref 133–144)
TRIGL SERPL-MCNC: 136 MG/DL
WBC # BLD AUTO: 5.4 10E3/UL (ref 4–11)

## 2021-10-08 PROCEDURE — 99396 PREV VISIT EST AGE 40-64: CPT | Performed by: FAMILY MEDICINE

## 2021-10-08 PROCEDURE — 36415 COLL VENOUS BLD VENIPUNCTURE: CPT | Performed by: FAMILY MEDICINE

## 2021-10-08 PROCEDURE — 82043 UR ALBUMIN QUANTITATIVE: CPT | Performed by: FAMILY MEDICINE

## 2021-10-08 PROCEDURE — 80048 BASIC METABOLIC PNL TOTAL CA: CPT | Performed by: FAMILY MEDICINE

## 2021-10-08 PROCEDURE — 80061 LIPID PANEL: CPT | Performed by: FAMILY MEDICINE

## 2021-10-08 PROCEDURE — 99214 OFFICE O/P EST MOD 30 MIN: CPT | Mod: 25 | Performed by: FAMILY MEDICINE

## 2021-10-08 PROCEDURE — 85027 COMPLETE CBC AUTOMATED: CPT | Performed by: FAMILY MEDICINE

## 2021-10-08 PROCEDURE — 86803 HEPATITIS C AB TEST: CPT | Performed by: FAMILY MEDICINE

## 2021-10-08 PROCEDURE — 83036 HEMOGLOBIN GLYCOSYLATED A1C: CPT | Performed by: FAMILY MEDICINE

## 2021-10-08 RX ORDER — SILDENAFIL CITRATE 20 MG/1
TABLET ORAL
Qty: 40 TABLET | Refills: 3 | Status: SHIPPED | OUTPATIENT
Start: 2021-10-08 | End: 2022-05-25

## 2021-10-08 RX ORDER — LISINOPRIL 5 MG/1
5 TABLET ORAL DAILY
Qty: 90 TABLET | Refills: 1 | Status: SHIPPED | OUTPATIENT
Start: 2021-10-08 | End: 2022-06-07

## 2021-10-08 RX ORDER — METOPROLOL TARTRATE 25 MG/1
25 TABLET, FILM COATED ORAL 2 TIMES DAILY
Qty: 180 TABLET | Refills: 1 | Status: SHIPPED | OUTPATIENT
Start: 2021-10-08 | End: 2023-05-12

## 2021-10-08 RX ORDER — ATORVASTATIN CALCIUM 80 MG/1
80 TABLET, FILM COATED ORAL AT BEDTIME
Qty: 90 TABLET | Refills: 1 | Status: SHIPPED | OUTPATIENT
Start: 2021-10-08 | End: 2022-04-19

## 2021-10-08 ASSESSMENT — ENCOUNTER SYMPTOMS
HEADACHES: 0
ARTHRALGIAS: 0
DYSURIA: 0
EYE PAIN: 0
CONSTIPATION: 0
DIARRHEA: 0
HEMATURIA: 0
HEARTBURN: 0
FEVER: 0
SORE THROAT: 0
MYALGIAS: 0
PALPITATIONS: 0
FREQUENCY: 0
NAUSEA: 0
ABDOMINAL PAIN: 0
COUGH: 0
WEAKNESS: 0
PARESTHESIAS: 0
NERVOUS/ANXIOUS: 0
SHORTNESS OF BREATH: 0
JOINT SWELLING: 0
CHILLS: 0
DIZZINESS: 0
HEMATOCHEZIA: 0

## 2021-10-08 ASSESSMENT — MIFFLIN-ST. JEOR: SCORE: 1733.04

## 2021-10-08 NOTE — LETTER
October 19, 2021      Tip Larson   [address] [place] [address]        Dear ,    We are writing to inform you of your test results.    Here are your results for your recent labs.     Your cholesterol labs are stable.     Your kidney functions are stable.     Your hemoglobin A1C, which measure your average blood sugar for three months is in the prediabetes range. This means that you are at risk for developing diabetes mellitus II. You can improve your blood sugars by controlling the amount and carbohydrates you eat and by increasing your daily activity level. We will continue to monitor your levels every six months.     Rest of your labs were stable.      Resulted Orders   Lipid Profile (Chol, Trig, HDL, LDL calc)   Result Value Ref Range    Cholesterol 157 <200 mg/dL    Triglycerides 136 <150 mg/dL    Direct Measure HDL 36 (L) >=40 mg/dL    LDL Cholesterol Calculated 94 <=100 mg/dL    Non HDL Cholesterol 121 <130 mg/dL    Patient Fasting > 8hrs? Yes     Narrative    Cholesterol  Desirable:  <200 mg/dL    Triglycerides  Normal:  Less than 150 mg/dL  Borderline High:  150-199 mg/dL  High:  200-499 mg/dL  Very High:  Greater than or equal to 500 mg/dL    Direct Measure HDL  Female:  Greater than or equal to 50 mg/dL   Male:  Greater than or equal to 40 mg/dL    LDL Cholesterol  Desirable:  <100mg/dL  Above Desirable:  100-129 mg/dL   Borderline High:  130-159 mg/dL   High:  160-189 mg/dL   Very High:  >= 190 mg/dL    Non HDL Cholesterol  Desirable:  130 mg/dL  Above Desirable:  130-159 mg/dL  Borderline High:  160-189 mg/dL  High:  190-219 mg/dL  Very High:  Greater than or equal to 220 mg/dL   Basic metabolic panel  (Ca, Cl, CO2, Creat, Gluc, K, Na, BUN)   Result Value Ref Range    Sodium 137 133 - 144 mmol/L    Potassium 4.2 3.4 - 5.3 mmol/L    Chloride 106 94 - 109 mmol/L    Carbon Dioxide (CO2) 25 20 - 32 mmol/L    Anion Gap 6 3 - 14 mmol/L    Urea Nitrogen 20 7 - 30 mg/dL    Creatinine  0.90 0.66 - 1.25 mg/dL    Calcium 9.4 8.5 - 10.1 mg/dL    Glucose 114 (H) 70 - 99 mg/dL    GFR Estimate >90 >60 mL/min/1.73m2      Comment:      As of July 11, 2021, eGFR is calculated by the CKD-EPI creatinine equation, without race adjustment. eGFR can be influenced by muscle mass, exercise, and diet. The reported eGFR is an estimation only and is only applicable if the renal function is stable.   CBC with platelets   Result Value Ref Range    WBC Count 5.4 4.0 - 11.0 10e3/uL    RBC Count 5.20 4.40 - 5.90 10e6/uL    Hemoglobin 15.2 13.3 - 17.7 g/dL    Hematocrit 46.3 40.0 - 53.0 %    MCV 89 78 - 100 fL    MCH 29.2 26.5 - 33.0 pg    MCHC 32.8 31.5 - 36.5 g/dL    RDW 12.9 10.0 - 15.0 %    Platelet Count 240 150 - 450 10e3/uL   Albumin Random Urine Quantitative with Creat Ratio   Result Value Ref Range    Creatinine Urine mg/dL 69 mg/dL    Albumin Urine mg/L <5 mg/L    Albumin Urine mg/g Cr        Comment:      Unable to calculate:  Urine creatinine or albumin value below detectable level   Hemoglobin A1c   Result Value Ref Range    Hemoglobin A1C 5.7 (H) 0.0 - 5.6 %      Comment:      Normal <5.7%   Prediabetes 5.7-6.4%    Diabetes 6.5% or higher     Note: Adopted from ADA consensus guidelines.   Hepatitis C Screen Reflex to HCV RNA Quant and Genotype   Result Value Ref Range    Hepatitis C Antibody Nonreactive Nonreactive    Narrative    Assay performance characteristics have not been established for newborns, infants, and children.       If you have any questions or concerns, please call the clinic at the number listed above.       Sincerely,      Scott Dunham MD

## 2021-10-08 NOTE — PATIENT INSTRUCTIONS
Lab only appointment in six months.   Physical once per year.     Preventive Health Recommendations  Male Ages 50 - 64    Yearly exam:             See your health care provider every year in order to  o   Review health changes.   o   Discuss preventive care.    o   Review your medicines if your doctor has prescribed any.     Have a cholesterol test every 5 years, or more frequently if you are at risk for high cholesterol/heart disease.     Have a diabetes test (fasting glucose) every three years. If you are at risk for diabetes, you should have this test more often.     Have a colonoscopy at age 50, or have a yearly FIT test (stool test). These exams will check for colon cancer.      Talk with your health care provider about whether or not a prostate cancer screening test (PSA) is right for you.    You should be tested each year for STDs (sexually transmitted diseases), if you re at risk.     Shots: Get a flu shot each year. Get a tetanus shot every 10 years.     Nutrition:    Eat at least 5 servings of fruits and vegetables daily.     Eat whole-grain bread, whole-wheat pasta and brown rice instead of white grains and rice.     Get adequate Calcium and Vitamin D.     Lifestyle    Exercise for at least 150 minutes a week (30 minutes a day, 5 days a week). This will help you control your weight and prevent disease.     Limit alcohol to one drink per day.     No smoking.     Wear sunscreen to prevent skin cancer.     See your dentist every six months for an exam and cleaning.     See your eye doctor every 1 to 2 years.

## 2021-10-08 NOTE — PROGRESS NOTES
SUBJECTIVE:   CC: Tip Aguilar is an 56 year old male who presents for preventative health visit.     Patient has been advised of split billing requirements and indicates understanding: Yes  Healthy Habits:     Getting at least 3 servings of Calcium per day:  Yes    Bi-annual eye exam:  NO    Dental care twice a year:  NO    Sleep apnea or symptoms of sleep apnea:  None    Diet:  Regular (no restrictions)    Frequency of exercise:  4-5 days/week    Duration of exercise:  15-30 minutes    Taking medications regularly:  Yes    Medication side effects:  None    PHQ-2 Total Score: 0    Additional concerns today:  No      Today's PHQ-2 Score:   PHQ-2 ( 1999 Pfizer) 10/8/2021   Q1: Little interest or pleasure in doing things 0   Q2: Feeling down, depressed or hopeless 0   PHQ-2 Score 0   Q1: Little interest or pleasure in doing things Not at all   Q2: Feeling down, depressed or hopeless Not at all   PHQ-2 Score 0       Abuse: Current or Past(Physical, Sexual or Emotional)- No  Do you feel safe in your environment? Yes    Have you ever done Advance Care Planning? (For example, a Health Directive, POLST, or a discussion with a medical provider or your loved ones about your wishes): No, advance care planning information given to patient to review.  Patient plans to discuss their wishes with loved ones or provider.      Social History     Tobacco Use     Smoking status: Never Smoker     Smokeless tobacco: Never Used   Substance Use Topics     Alcohol use: Yes     Alcohol/week: 0.0 standard drinks     Comment: beer or wine  6-10 per week       Alcohol Use 10/8/2021   Prescreen: >3 drinks/day or >7 drinks/week? No   Prescreen: >3 drinks/day or >7 drinks/week? -       Last PSA:   PSA   Date Value Ref Range Status   09/15/2020 1.37 0 - 4 ug/L Final     Comment:     Assay Method:  Chemiluminescence using Siemens Vista analyzer       Reviewed orders with patient. Reviewed health maintenance and updated orders accordingly -  "Yes      Reviewed and updated as needed this visit by clinical staff   Allergies  Meds              Reviewed and updated as needed this visit by Provider                  Review of Systems   Constitutional: Negative for chills and fever.   HENT: Negative for congestion, ear pain, hearing loss and sore throat.    Eyes: Negative for pain and visual disturbance.   Respiratory: Negative for cough and shortness of breath.    Cardiovascular: Negative for chest pain, palpitations and peripheral edema.   Gastrointestinal: Negative for abdominal pain, constipation, diarrhea, heartburn, hematochezia and nausea.   Genitourinary: Positive for impotence. Negative for discharge, dysuria, frequency, genital sores, hematuria and urgency.   Musculoskeletal: Negative for arthralgias, joint swelling and myalgias.   Skin: Negative for rash.   Neurological: Negative for dizziness, weakness, headaches and paresthesias.   Psychiatric/Behavioral: Negative for mood changes. The patient is not nervous/anxious.      OBJECTIVE:   /70   Pulse 54   Temp 98  F (36.7  C)   Resp 16   Ht 1.753 m (5' 9\")   Wt 90.3 kg (199 lb)   SpO2 96%   BMI 29.39 kg/m    Physical Exam  GENERAL: healthy, alert and no distress  EYES: Eyes grossly normal to inspection,  conjunctivae and sclerae normal  HENT: ear canals and TM's normal, nose and mouth without ulcers or lesions  NECK: no adenopathy, no asymmetry, masses, or scars and thyroid normal to palpation  RESP: lungs clear to auscultation - no rales, rhonchi or wheezes  CV: regular rate and rhythm, normal S1 S2  ABDOMEN: soft, nontender, no hepatosplenomegaly, no masses and bowel sounds normal  MS: no gross musculoskeletal defects noted, no edema  SKIN: no suspicious lesions or rashes  NEURO: Normal strength and tone, mentation intact and speech normal  PSYCH: mentation appears normal, affect normal/bright    Diagnostic Test Results:  Labs reviewed in Epic    ASSESSMENT/PLAN:     1. Routine general " medical examination at a health care facility  - declined vaccinations including COVID, shingles and influenza vaccine     2. Coronary artery disease involving native heart without angina pectoris, unspecified vessel or lesion type  - followed by cardiology, reviewed recent visit  - atorvastatin (LIPITOR) 80 MG tablet; Take 1 tablet (80 mg) by mouth At Bedtime  Dispense: 90 tablet; Refill: 1  - lisinopril (ZESTRIL) 5 MG tablet; Take 1 tablet (5 mg) by mouth daily  Dispense: 90 tablet; Refill: 1  - metoprolol tartrate (LOPRESSOR) 25 MG tablet; Take 1 tablet (25 mg) by mouth 2 times daily  Dispense: 180 tablet; Refill: 1    3. Hyperlipidemia, unspecified hyperlipidemia type  - atorvastatin (LIPITOR) 80 MG tablet; Take 1 tablet (80 mg) by mouth At Bedtime  Dispense: 90 tablet; Refill: 1  - Lipid Profile (Chol, Trig, HDL, LDL calc); Future  - Lipid Profile (Chol, Trig, HDL, LDL calc)    4. Erectile dysfunction, unspecified erectile dysfunction type  - sildenafil (REVATIO) 20 MG tablet; TAKE 1-5 TABLETS BY MOUTH DAILY AS NEEDED.  DO NOT TAKE WITH NITROGLYCERIN, TERAZOSIN, OR DOXAZOSIN  Dispense: 40 tablet; Refill: 3    5. Essential hypertension  - metoprolol tartrate (LOPRESSOR) 25 MG tablet; Take 1 tablet (25 mg) by mouth 2 times daily  Dispense: 180 tablet; Refill: 1  - Basic metabolic panel  (Ca, Cl, CO2, Creat, Gluc, K, Na, BUN); Future  - CBC with platelets; Future  - Albumin Random Urine Quantitative with Creat Ratio; Future  - **Basic metabolic panel FUTURE 6mo; Future  - Basic metabolic panel  (Ca, Cl, CO2, Creat, Gluc, K, Na, BUN)  - CBC with platelets  - Albumin Random Urine Quantitative with Creat Ratio    6. Screening for diabetes mellitus  - Hemoglobin A1c; Future  - Hemoglobin A1c    7. Colon cancer screening  - Adult Gastro Ref - Procedure Only; Future    8. Encounter for hepatitis C screening test for low risk patient  - Hepatitis C Screen Reflex to HCV RNA Quant and Genotype; Future  - Hepatitis C Screen  "Reflex to HCV RNA Quant and Genotype    Lab only appointment in 6 months.   Physical once/year.    Patient has been advised of split billing requirements and indicates understanding: Yes  COUNSELING:   Reviewed preventive health counseling, as reflected in patient instructions    Estimated body mass index is 29.98 kg/m  as calculated from the following:    Height as of 9/15/20: 1.765 m (5' 9.5\").    Weight as of 3/3/21: 93.4 kg (206 lb).         He reports that he has never smoked. He has never used smokeless tobacco.      Counseling Resources:  ATP IV Guidelines  Pooled Cohorts Equation Calculator  FRAX Risk Assessment  ICSI Preventive Guidelines  Dietary Guidelines for Americans, 2010  USDA's MyPlate  ASA Prophylaxis  Lung CA Screening    Sangeetaa Zenaida Dunham MD  [facility]  "

## 2021-10-09 LAB
CREAT UR-MCNC: 69 MG/DL
MICROALBUMIN UR-MCNC: <5 MG/L
MICROALBUMIN/CREAT UR: NORMAL MG/G{CREAT}

## 2021-10-15 NOTE — RESULT ENCOUNTER NOTE
Please send the letter to the patient with the following.     Here are your results for your recent labs.     Your cholesterol labs are stable.     Your kidney functions are stable.     Your hemoglobin A1C, which measure your average blood sugar for three months is in the prediabetes range. This means that you are at risk for developing diabetes mellitus II. You can improve your blood sugars by controlling the amount and carbohydrates you eat and by increasing your daily activity level. We will continue to monitor your levels every six months.     Rest of your labs were stable.     Please call or message me if you have questions or concerns.

## 2021-11-30 ENCOUNTER — HOSPITAL ENCOUNTER (OUTPATIENT)
Facility: CLINIC | Age: 56
End: 2021-11-30
Attending: COLON & RECTAL SURGERY | Admitting: COLON & RECTAL SURGERY
Payer: COMMERCIAL

## 2021-11-30 ENCOUNTER — TELEPHONE (OUTPATIENT)
Dept: GASTROENTEROLOGY | Facility: CLINIC | Age: 56
End: 2021-11-30
Payer: COMMERCIAL

## 2021-11-30 DIAGNOSIS — Z11.59 ENCOUNTER FOR SCREENING FOR OTHER VIRAL DISEASES: ICD-10-CM

## 2021-11-30 NOTE — TELEPHONE ENCOUNTER
Screening Questions  1. Are you active on mychart? NO    2. What insurance is in the chart? [employer]    2.  Ordering/Referring Provider:     3. BMI 28.0, If greater than 40 review exclusion criteria    4.  Respiratory Screening (If yes to any of the following Hospital setting only):     Do you use daily home oxygen? NO  Do you have mod to severe Obstructive Sleep Apnea? NO   Do you have Pulmonary Hypertension? NO   Do you have UNCONTROLLED asthma? NO    5. Have you had a heart or lung transplant (If yes, please review exclusion criteria) ? NO    6. Are you currently on dialysis or have chronic kidney disease? NO    7. Have you had a stroke or Transient ischemic attack (TIA) within 6 months? NO    8. In the past 6 months, have you had any heart related issues including cardiomyopathy or heart attack? NO                 If yes, did it require cardiac stenting or other implantable device?      9. Do you have any implantable devices in your body (pacemaker, defib, LVAD)? NO    10. Do you take nitroglycerin? If yes, how often? NO    11. Are you currently taking any blood thinners? YES    12. Are you a diabetic? NO    13. (Females) Are you currently pregnant? NA  If yes, how many weeks?      15. Are you taking any prescription pain medications on a routine schedule? NO If yes, MAC sedation.    16. Do you have any chemical dependencies such as alcohol, street drugs, or methadone? NOIf yes, MAC sedation.    17. Do you have any history of post-traumatic stress syndrome, severe anxiety or history of psychosis? NO    18. Do you transfer independently? YES    19.  Do you have any issues with constipation? NO    20. Preferred Pharmacy for Pre Prescription NA    Scheduling Details    Which Colonoscopy Prep was Sent?: MIRALAX  Procedure Scheduled: COLONOSCOPY  Surgeon: AYANNA  Date of Procedure: 12/20  [facility]  Caller (Please ask for phone number if not scheduled by patient): CLAIRE      Sedation Type: CS  Conscious Sedation-  Needs  for 6 hours after the procedure  MAC/General-Needs  for 24 hours after procedure    Pre-op Required at [facility], [place], [place] and OR for MAC sedation:   (if yes advise patient they will need a pre-op prior to procedure)      Is patient on blood tHinners? -YES (If yes- inform patient to follow up with PCP or provider for follow up instructions)     Informed patient they will need an adult  YES  Cannot take any type of public or medical transportation alone    [facility] or [facility]tern[facility]: YES    Confirmed Nurse will call to complete assessment YES    Additional comments: NA

## 2021-12-16 ENCOUNTER — LAB (OUTPATIENT)
Dept: LAB | Facility: CLINIC | Age: 56
End: 2021-12-16
Payer: COMMERCIAL

## 2021-12-16 DIAGNOSIS — Z11.59 ENCOUNTER FOR SCREENING FOR OTHER VIRAL DISEASES: ICD-10-CM

## 2021-12-16 LAB — SARS-COV-2 RNA RESP QL NAA+PROBE: POSITIVE

## 2021-12-16 PROCEDURE — U0003 INFECTIOUS AGENT DETECTION BY NUCLEIC ACID (DNA OR RNA); SEVERE ACUTE RESPIRATORY SYNDROME CORONAVIRUS 2 (SARS-COV-2) (CORONAVIRUS DISEASE [COVID-19]), AMPLIFIED PROBE TECHNIQUE, MAKING USE OF HIGH THROUGHPUT TECHNOLOGIES AS DESCRIBED BY CMS-2020-01-R: HCPCS

## 2021-12-16 PROCEDURE — U0005 INFEC AGEN DETEC AMPLI PROBE: HCPCS

## 2021-12-17 ENCOUNTER — TELEPHONE (OUTPATIENT)
Dept: LAB | Facility: CLINIC | Age: 56
End: 2021-12-17
Payer: COMMERCIAL

## 2021-12-17 NOTE — TELEPHONE ENCOUNTER
"Pre-procedure    Coronavirus (COVID-19) Notification    Caller Name (Patient, parent, daughter/son, grandparent, etc)  patient    Reason for call  Notify of Positive Coronavirus (COVID-19) lab results, assess symptoms,  review  [employer] [place] recommendations    Lab Result    Lab test:  2019-nCoV rRt-PCR or SARS-CoV-2 PCR    Oropharyngeal AND/OR nasopharyngeal swabs is POSITIVE for 2019-nCoV RNA/SARS-COV-2 PCR (COVID-19 virus)    RN Recommendations/Instructions per [facility] Coronavirus COVID-19 recommendations    Brief introduction script  Introduce self then review script:  \"I am calling on behalf of [employer].  We were notified that your Coronavirus test (COVID-19) for was POSITIVE for the virus.  I have some information to relay to you but first I wanted to mention that the [place] Dept of Health will be contacting you shortly [it's possible MD already called Patient] to talk to you more about how you are feeling and other people you have had contact with who might now also have the virus.  Also,  [employer] [place] is Partnering with the [facility] for Covid-19 research, you may be contacted directly by research staff.\"    Assessment (Inquire about Patient's current symptoms)   Assessment   Current Symptoms at time of phone call: (if no symptoms, document No symptoms] No symptoms   Symptoms onset (if applicable) n/a     If at time of call, Patients symptoms hare worsened, the Patient should contact 911 or have someone drive them to Emergency Dept promptly:      If Patient calling 911, inform 911 personal that you have tested positive for the Coronavirus (COVID-19).  Place mask on and await 911 to arrive.    If Emergency Dept, If possible, please have another adult drive you to the Emergency Dept but you need to wear mask when in contact with other people.      Monoclonal Antibody Administration    You may be eligible to receive a new treatment with a monoclonal antibody for preventing " "hospitalization in patients at high risk for complications from COVID-19.   This medication is still experimental and available on a limited basis; it is given through an IV and must be given at an infusion center. Please note that not all people who are eligible will receive the medication since it is in limited supply.     Are you interested in being considered for this medication?  Yes.   Is the patient symptomatic? No. Patient does not qualify.  Does the patient fit the criteria: No    If patient qualifies based on above criteria:  \"You will be contacted if you are selected to receive this treatment in the next 1-2 business days.   This is time sensitive and if you are not selected in the next 1-2 business days, you will not receive the medication.  If you do not receive a call to schedule, you have not been selected.\"      Review information with Patient    Your result was positive. This means you have COVID-19 (coronavirus).  We have sent you a letter that reviews the information that I'll be reviewing with you now.    How can I protect others?    If you have symptoms: stay home and away from others (self-isolate) until:    You've had no fever--and no medicine that reduces fever--for 1 full day (24 hours). And       Your other symptoms have gotten better. For example, your cough or breathing has improved. And     At least 10 days have passed since your symptoms started. (If you've been told by a doctor that you have a weak immune system, wait 20 days.)     If you don't have symptoms: Stay home and away from others (self-isolate) until at least 10 days have passed since your first positive COVID-19 test. (Date test collected)    During this time:    Stay in your own room, including for meals. Use your own bathroom if you can.    Stay away from others in your home. No hugging, kissing or shaking hands. No visitors.     Don't go to work, school or anywhere else.     Clean  high touch  surfaces often (doorknobs, " counters, handles, etc.). Use a household cleaning spray or wipes. You'll find a full list on the EPA website at www.epa.gov/pesticide-registration/list-n-disinfectants-use-against-sars-cov-2.     Cover your mouth and nose with a mask, tissue or other face covering to avoid spreading germs.    Wash your hands and face often with soap and water.    Make a list of people you have been in close contact with recently, even if either of you wore a face covering.   ; Start your list from 2 days before you became ill or had a positive test.  ; Include anyone that was within 6 feet of you for a cumulative total of 15 minutes or more in 24 hours. (Example: if you sat next to Nic for 5 minutes in the morning and 10 minutes in the afternoon, then you were in close contact for 15 minutes total that day. Nic would be added to your list.)    A public health worker will call or text you. It is important that you answer. They will ask you questions about possible exposures to COVID-19, such as people you have been in direct contact with and places you have visited.    Tell the people on your list that you have COVID-19; they should stay away from others for 14 days starting from the last time they were in contact with you (unless you are told something different from a public health worker).     Caregivers in these groups are at risk for severe illness due to COVID-19:  o People 65 years and older  o People who live in a nursing home or long-term care facility  o People with chronic disease (lung, heart, cancer, diabetes, kidney, liver, immunologic)  o People who have a weakened immune system, including those who:  - Are in cancer treatment  - Take medicine that weakens the immune system, such as corticosteroids  - Had a bone marrow or organ transplant  - Have an immune deficiency  - Have poorly controlled HIV or AIDS  - Are obese (body mass index of 40 or higher)  - Smoke regularly    Caregivers should wear gloves while washing  dishes, handling laundry and cleaning bedrooms and bathrooms.    Wash and dry laundry with special caution. Don't shake dirty laundry, and use the warmest water setting you can.    If you have a weakened immune system, ask your doctor about other actions you should take.    For more tips, go to www.cdc.gov/coronavirus/2019-ncov/downloads/10Things.pdf.    You should not go back to work until you meet the guidelines above for ending your home isolation. You don't need to be retested for COVID-19 before going back to work--studies show that you won't spread the virus if it's been at least 10 days since your symptoms started (or 20 days, if you have a weak immune system).    Employers: This document serves as formal notice of your employee's medical guidelines for going back to work. They must meet the above guidelines before going back to work in person.    How can I take care of myself?    1. Get lots of rest. Drink extra fluids (unless a doctor has told you not to).    2. Take Tylenol (acetaminophen) for fever or pain. If you have liver or kidney problems, ask your family doctor if it's okay to take Tylenol.     Take either:     650 mg (two 325 mg pills) every 4 to 6 hours, or     1,000 mg (two 500 mg pills) every 8 hours as needed.     Note: Don't take more than 3,000 mg in one day. Acetaminophen is found in many medicines (both prescribed and over-the-counter medicines). Read all labels to be sure you don't take too much.    For children, check the Tylenol bottle for the right dose (based on their age or weight).    3. If you have other health problems (like cancer, heart failure, an organ transplant or severe kidney disease): Call your specialty clinic if you don't feel better in the next 2 days.    4. Know when to call 911: Emergency warning signs include:    Trouble breathing or shortness of breath    Pain or pressure in the chest that doesn't go away    Feeling confused like you haven't felt before, or not  being able to wake up    Bluish-colored lips or face    5. Sign up for "[employer]. We know it's scary to hear that you have COVID-19. We want to track your symptoms to make sure you're okay over the next 2 weeks. Please look for an email from "[employer]--this is a free, online program that we'll use to keep in touch. To sign up, follow the link in the email. Learn more at [email or web address].    Where can I get more information?    [facility]: [email or web address]    Coronavirus Basics: www.health.[facility].[place]./diseases/coronavirus/basics.html    What to Do If You're Sick: www.cdc.gov/coronavirus/2019-ncov/about/steps-when-sick.html    Ending Home Isolation: www.cdc.gov/coronavirus/2019-ncov/hcp/disposition-in-home-patients.html     Caring for Someone with COVID-19: www.cdc.gov/coronavirus/2019-ncov/if-you-are-sick/care-for-someone.html     [facility] clinical trials (COVID-19 research studies): clinicalaffairs.[facility]-clinical-trials     A Positive COVID-19 letter will be sent via [employer] or the mail. (Exception, no letters sent to Presurgerical/Preprocedure Patients)    Geraldine Pisano LPN

## 2022-02-23 ENCOUNTER — LAB REQUISITION (OUTPATIENT)
Dept: LAB | Facility: CLINIC | Age: 57
End: 2022-02-23

## 2022-02-23 LAB
ALBUMIN SERPL-MCNC: 4.5 G/DL (ref 3.5–5)
ALBUMIN UR-MCNC: NEGATIVE MG/DL
ALP SERPL-CCNC: 52 U/L (ref 45–120)
ALT SERPL W P-5'-P-CCNC: 39 U/L (ref 0–45)
ANION GAP SERPL CALCULATED.3IONS-SCNC: 11 MMOL/L (ref 5–18)
APPEARANCE UR: CLEAR
AST SERPL W P-5'-P-CCNC: 25 U/L (ref 0–40)
BASOPHILS # BLD AUTO: 0 10E3/UL (ref 0–0.2)
BASOPHILS NFR BLD AUTO: 1 %
BILIRUB SERPL-MCNC: 0.9 MG/DL (ref 0–1)
BILIRUB UR QL STRIP: NEGATIVE
BUN SERPL-MCNC: 13 MG/DL (ref 8–22)
CALCIUM SERPL-MCNC: 9.9 MG/DL (ref 8.5–10.5)
CHLORIDE BLD-SCNC: 105 MMOL/L (ref 98–107)
CHOLEST SERPL-MCNC: 172 MG/DL
CO2 SERPL-SCNC: 23 MMOL/L (ref 22–31)
COLOR UR AUTO: COLORLESS
CREAT SERPL-MCNC: 0.83 MG/DL (ref 0.7–1.3)
EOSINOPHIL # BLD AUTO: 0.1 10E3/UL (ref 0–0.7)
EOSINOPHIL NFR BLD AUTO: 2 %
ERYTHROCYTE [DISTWIDTH] IN BLOOD BY AUTOMATED COUNT: 12.9 % (ref 10–15)
FASTING STATUS PATIENT QL REPORTED: ABNORMAL
GFR SERPL CREATININE-BSD FRML MDRD: >90 ML/MIN/1.73M2
GLUCOSE BLD-MCNC: 117 MG/DL (ref 70–125)
GLUCOSE UR STRIP-MCNC: NEGATIVE MG/DL
HCT VFR BLD AUTO: 49.2 % (ref 40–53)
HDLC SERPL-MCNC: 35 MG/DL
HGB BLD-MCNC: 15.7 G/DL (ref 13.3–17.7)
HGB UR QL STRIP: NEGATIVE
IMM GRANULOCYTES # BLD: 0 10E3/UL
IMM GRANULOCYTES NFR BLD: 0 %
KETONES UR STRIP-MCNC: NEGATIVE MG/DL
LDLC SERPL CALC-MCNC: 108 MG/DL
LEUKOCYTE ESTERASE UR QL STRIP: NEGATIVE
LYMPHOCYTES # BLD AUTO: 1.1 10E3/UL (ref 0.8–5.3)
LYMPHOCYTES NFR BLD AUTO: 19 %
MCH RBC QN AUTO: 29.3 PG (ref 26.5–33)
MCHC RBC AUTO-ENTMCNC: 31.9 G/DL (ref 31.5–36.5)
MCV RBC AUTO: 92 FL (ref 78–100)
MONOCYTES # BLD AUTO: 0.4 10E3/UL (ref 0–1.3)
MONOCYTES NFR BLD AUTO: 7 %
NEUTROPHILS # BLD AUTO: 4.1 10E3/UL (ref 1.6–8.3)
NEUTROPHILS NFR BLD AUTO: 71 %
NITRATE UR QL: NEGATIVE
NRBC # BLD AUTO: 0 10E3/UL
NRBC BLD AUTO-RTO: 0 /100
PH UR STRIP: 7 [PH] (ref 5–7)
PLATELET # BLD AUTO: 276 10E3/UL (ref 150–450)
POTASSIUM BLD-SCNC: 4.1 MMOL/L (ref 3.5–5)
PROT SERPL-MCNC: 7.3 G/DL (ref 6–8)
PSA SERPL-MCNC: 1.32 UG/L (ref 0–3.5)
RBC # BLD AUTO: 5.36 10E6/UL (ref 4.4–5.9)
RBC URINE: 1 /HPF
SODIUM SERPL-SCNC: 139 MMOL/L (ref 136–145)
SP GR UR STRIP: 1.01 (ref 1–1.03)
TRIGL SERPL-MCNC: 144 MG/DL
UROBILINOGEN UR STRIP-MCNC: <2 MG/DL
WBC # BLD AUTO: 5.7 10E3/UL (ref 4–11)
WBC URINE: <1 /HPF

## 2022-02-23 PROCEDURE — 80053 COMPREHEN METABOLIC PANEL: CPT | Performed by: NURSE PRACTITIONER

## 2022-02-23 PROCEDURE — 82040 ASSAY OF SERUM ALBUMIN: CPT | Performed by: NURSE PRACTITIONER

## 2022-02-23 PROCEDURE — G0103 PSA SCREENING: HCPCS | Performed by: NURSE PRACTITIONER

## 2022-02-23 PROCEDURE — 85025 COMPLETE CBC W/AUTO DIFF WBC: CPT | Performed by: NURSE PRACTITIONER

## 2022-02-23 PROCEDURE — 80061 LIPID PANEL: CPT | Performed by: NURSE PRACTITIONER

## 2022-02-23 PROCEDURE — 81001 URINALYSIS AUTO W/SCOPE: CPT | Performed by: NURSE PRACTITIONER

## 2022-04-19 DIAGNOSIS — I25.10 CORONARY ARTERY DISEASE INVOLVING NATIVE HEART WITHOUT ANGINA PECTORIS, UNSPECIFIED VESSEL OR LESION TYPE: ICD-10-CM

## 2022-04-19 DIAGNOSIS — E78.5 HYPERLIPIDEMIA, UNSPECIFIED HYPERLIPIDEMIA TYPE: ICD-10-CM

## 2022-04-19 RX ORDER — ATORVASTATIN CALCIUM 80 MG/1
TABLET, FILM COATED ORAL
Qty: 90 TABLET | Refills: 1 | Status: SHIPPED | OUTPATIENT
Start: 2022-04-19 | End: 2022-10-20

## 2022-04-19 NOTE — TELEPHONE ENCOUNTER
[facility] Protocol Passed 04/19/2022 01:46 PM   Protocol Details  LDL on file in past 12 months    No abnormal creatine kinase in past 12 months    Recent (12 mo) or future (30 days) visit within the authorizing provider's specialty    Medication is active on med list    Patient is age 18 or older

## 2022-05-16 ENCOUNTER — TELEPHONE (OUTPATIENT)
Dept: CARDIOLOGY | Facility: CLINIC | Age: 57
End: 2022-05-16
Payer: COMMERCIAL

## 2022-05-16 NOTE — TELEPHONE ENCOUNTER
Called back and spoke with pt. Pt stated he has been taking sildenafil for erectile dysfunction for several years and in the past couple of weeks it has stopped working for him. Pt stated he has been taking the maximum dose of 5 tablets or 100 mg. Pt is concerned about this and wants to review with Dr. Plaza if he should try a different medication or be seen in clinic. Pt denied any other symptoms. Last visit with Dr. Plaza was on 3/3/21, planned for follow up in 2 years. Advised will route to Dr. Plaza to review.

## 2022-05-16 NOTE — TELEPHONE ENCOUNTER
[facility] Call Center    Phone Message    May a detailed message be left on voicemail: yes     Reason for Call: Other: Pt would like a call back to discuss possible med change and did not give full detail     Action Taken: Message routed to:  [facility] Cardio    Travel Screening: Not Applicable

## 2022-05-17 NOTE — TELEPHONE ENCOUNTER
Received response from Dr. Plaza:     Merrick Plaza MD Hair, Ashley W RN  Caller: Unspecified (Yesterday,  1:43 PM)  He can discuss this further with his primary doctor. However, he can stop the Metoprolol as this might be contributing to his ED. Thanks     Called back to pt, reviewed Dr. Plaza's recommendations to review with PCP and can try stopping metoprolol to see if this is contributing. Discussed if pt would like to try stopping metoprolol to taper off and not stop suddenly. Reviewed pt should follow up with PCP to ensure BP still well controlled and for additional recommendations if stopping the metoprolol does not help his symptoms. Pt verbalized understanding and agreement with plan.

## 2022-06-05 DIAGNOSIS — I25.10 CORONARY ARTERY DISEASE INVOLVING NATIVE HEART WITHOUT ANGINA PECTORIS, UNSPECIFIED VESSEL OR LESION TYPE: ICD-10-CM

## 2022-06-07 RX ORDER — LISINOPRIL 5 MG/1
TABLET ORAL
Qty: 90 TABLET | Refills: 0 | Status: SHIPPED | OUTPATIENT
Start: 2022-06-07 | End: 2022-09-02

## 2022-06-07 NOTE — TELEPHONE ENCOUNTER
Prescription approved per [facility] Refill Protocol.  MARIELLE MurciaN, RN  [facility]

## 2022-06-21 ENCOUNTER — LAB REQUISITION (OUTPATIENT)
Dept: LAB | Facility: CLINIC | Age: 57
End: 2022-06-21
Payer: COMMERCIAL

## 2022-06-21 DIAGNOSIS — R53.83 OTHER FATIGUE: ICD-10-CM

## 2022-06-21 DIAGNOSIS — Z13.1 ENCOUNTER FOR SCREENING FOR DIABETES MELLITUS: ICD-10-CM

## 2022-06-21 LAB — HBA1C MFR BLD: 5.9 %

## 2022-06-21 PROCEDURE — 83036 HEMOGLOBIN GLYCOSYLATED A1C: CPT | Mod: ORL | Performed by: FAMILY MEDICINE

## 2022-06-21 PROCEDURE — 84270 ASSAY OF SEX HORMONE GLOBUL: CPT | Mod: ORL | Performed by: FAMILY MEDICINE

## 2022-06-21 PROCEDURE — 84403 ASSAY OF TOTAL TESTOSTERONE: CPT | Mod: ORL | Performed by: FAMILY MEDICINE

## 2022-06-23 LAB
SHBG SERPL-SCNC: 21 NMOL/L (ref 11–80)
TESTOST FREE SERPL-MCNC: 7.89 NG/DL
TESTOST SERPL-MCNC: 315 NG/DL (ref 240–950)

## 2022-07-28 DIAGNOSIS — N52.9 ERECTILE DYSFUNCTION, UNSPECIFIED ERECTILE DYSFUNCTION TYPE: ICD-10-CM

## 2022-08-01 RX ORDER — SILDENAFIL CITRATE 20 MG/1
TABLET ORAL
Qty: 40 TABLET | Refills: 0 | Status: SHIPPED | OUTPATIENT
Start: 2022-08-01

## 2022-08-01 NOTE — TELEPHONE ENCOUNTER
---Prescription approved per [facility] Refill Protocol.       Laura Delarosa RN BSN     [facility]      --Last visit:  10/8/2021     --Future Visit: none.

## 2022-10-17 DIAGNOSIS — E78.5 HYPERLIPIDEMIA, UNSPECIFIED HYPERLIPIDEMIA TYPE: ICD-10-CM

## 2022-10-17 DIAGNOSIS — I25.10 CORONARY ARTERY DISEASE INVOLVING NATIVE HEART WITHOUT ANGINA PECTORIS, UNSPECIFIED VESSEL OR LESION TYPE: ICD-10-CM

## 2022-10-20 RX ORDER — ATORVASTATIN CALCIUM 80 MG/1
TABLET, FILM COATED ORAL
Qty: 90 TABLET | Refills: 0 | Status: SHIPPED | OUTPATIENT
Start: 2022-10-20

## 2022-10-20 NOTE — TELEPHONE ENCOUNTER
,  --Please contact patient and ask to schedule an annual preventive/physical.      --Last visit:  10/8/2021     --Future Visit: none.      ---Prescription approved per [facility] Refill Protocol.       Laura Delarosa RN BSN     [facility]

## 2023-02-21 ENCOUNTER — TRANSFERRED RECORDS (OUTPATIENT)
Dept: HEALTH INFORMATION MANAGEMENT | Facility: CLINIC | Age: 58
End: 2023-02-21
Payer: COMMERCIAL

## 2023-05-02 ENCOUNTER — TRANSFERRED RECORDS (OUTPATIENT)
Dept: HEALTH INFORMATION MANAGEMENT | Facility: CLINIC | Age: 58
End: 2023-05-02
Payer: COMMERCIAL

## 2023-05-12 ENCOUNTER — OFFICE VISIT (OUTPATIENT)
Dept: CARDIOLOGY | Facility: CLINIC | Age: 58
End: 2023-05-12
Payer: COMMERCIAL

## 2023-05-12 VITALS
HEART RATE: 66 BPM | SYSTOLIC BLOOD PRESSURE: 118 MMHG | BODY MASS INDEX: 31.25 KG/M2 | WEIGHT: 211 LBS | HEIGHT: 69 IN | DIASTOLIC BLOOD PRESSURE: 66 MMHG | OXYGEN SATURATION: 94 %

## 2023-05-12 DIAGNOSIS — E78.5 HYPERLIPIDEMIA LDL GOAL <70: ICD-10-CM

## 2023-05-12 DIAGNOSIS — I25.10 CORONARY ARTERY DISEASE INVOLVING NATIVE CORONARY ARTERY OF NATIVE HEART WITHOUT ANGINA PECTORIS: Primary | ICD-10-CM

## 2023-05-12 PROCEDURE — 99214 OFFICE O/P EST MOD 30 MIN: CPT | Performed by: INTERNAL MEDICINE

## 2023-05-12 RX ORDER — EZETIMIBE 10 MG/1
10 TABLET ORAL DAILY
Qty: 90 TABLET | Refills: 3 | Status: SHIPPED | OUTPATIENT
Start: 2023-05-12

## 2023-05-12 NOTE — LETTER
5/12/2023    Scott Dunham MD  [address]  [place] [address]    RE: Tip Aguilar       Dear Colleague,     I had the pleasure of seeing Tip Aguilar in the [facility].  REASON FOR VISIT:  Followup for coronary artery disease.      HISTORY OF PRESENT ILLNESS:  I had the pleasure of seeing Tip Aguilar at the [facility] in [place] this morning.  He is a very pleasant 58-year-old gentleman with known coronary artery disease and hyperlipidemia.  He suffered an inferior ST elevation MI in 02/2016.  Coronary angiogram at that time demonstrated an occluded large right posterolateral branch and moderate severe mid RCA stenosis.  He had no significant disease elsewhere.  He subsequently underwent successful PCI with drug-eluting stent placement in the right posterolateral branch and the mid RCA.      He has done quite well from a cardiac point of view since then. I reviewed his most recent lipid profile.  His LDL is 108 which is not at goal.     IMPRESSION, REPORT, PLAN:   1.  Coronary artery disease.   2.  Status post prior inferior myocardial infarction and stenting of the mid RCA and right posterolateral branch.   3.  Hyperlipidemia   4.  Hypertension.      Mr. Aguilar continues to do well from a cardiac point of view.  He currently denies any exertional chest pain or shortness of breath.  His risk factors are well controlled with the exception of his hyperlipidemia.  In order to lower his LDL, will add Zetia 10 mg daily to his regimen.  Repeat lipid profile through primary care doctor.     We will see him in approximately 2 years for followup but sooner if he develops symptoms.      I appreciate the opportunity to be part of this patient's care.         SHRUTI DE LA VEGA MD        Today's clinic visit entailed:  30 minutes spent by me on the date of the encounter doing chart review, history and exam, documentation and further activities per the note  Provider   Link to [facility] Help Grid       Orders Placed This Encounter   Procedures    Follow-Up with Cardiologist       Orders Placed This Encounter   Medications    ezetimibe (ZETIA) 10 MG tablet     Sig: Take 1 tablet (10 mg) by mouth daily     Dispense:  90 tablet     Refill:  3       Medications Discontinued During This Encounter   Medication Reason    metoprolol tartrate (LOPRESSOR) 25 MG tablet          Encounter Diagnoses   Name Primary?    Hyperlipidemia LDL goal <70     Coronary artery disease involving native coronary artery of native heart without angina pectoris Yes       CURRENT MEDICATIONS:  Current Outpatient Medications   Medication Sig Dispense Refill    aspirin 81 MG tablet Take 1 tablet (81 mg) by mouth daily 30 tablet     atorvastatin (LIPITOR) 80 MG tablet TAKE 1 TABLET BY MOUTH AT BEDTIME 90 tablet 0    Bioflavonoid Products (VITAMIN C) CHEW       ezetimibe (ZETIA) 10 MG tablet Take 1 tablet (10 mg) by mouth daily 90 tablet 3    lisinopril (ZESTRIL) 5 MG tablet Take 1 tablet by mouth once daily 90 tablet 0    sildenafil (REVATIO) 20 MG tablet TAKE 1 TO 5 TABLETS BY MOUTH ONCE DAILY AS NEEDED. DO NOT TAKE WITH NITROGLYCERIN, TERAZOSIN, OR DOXAZOSIN 40 tablet 0    vitamin E 400 units TABS Take 400 Units by mouth daily         ALLERGIES   No Known Allergies    PAST MEDICAL HISTORY:  Past Medical History:   Diagnosis Date    Ascending aorta dilation (H) 09/15/2020    ASCVD (arteriosclerotic cardiovascular disease)     CAD (coronary artery disease) 02/2016    ED (erectile dysfunction)     HLD (hyperlipidemia)     HTN (hypertension)     STEMI (ST elevation myocardial infarction) (H) 02/2016    s/p PAVITHRA to right posterolateral branch and mid RCA       PAST SURGICAL HISTORY:  Past Surgical History:   Procedure Laterality Date    CARDIAC CATHERIZATION  2/13/16    Successful PCI with PAVITHRA placement in the rPLA/distal RCA, mid RCA    VASCULAR SURGERY      Stent replace (coronary)       FAMILY HISTORY:  Family History  "  Problem Relation Age of Onset    Coronary Artery Disease Father 58        heart valve replacement    Myocardial Infarction Father     Diabetes Mother     Colon Cancer No family hx of        SOCIAL HISTORY:  Social History     Socioeconomic History    Marital status: Patient Declined     Spouse name: None    Number of children: None    Years of education: None    Highest education level: None   Tobacco Use    Smoking status: Never    Smokeless tobacco: Never   Substance and Sexual Activity    Alcohol use: Not Currently     Alcohol/week: 6.0 - 10.0 standard drinks of alcohol     Types: 6 - 10 Standard drinks or equivalent per week    Drug use: No    Sexual activity: Yes     Partners: Female     Birth control/protection: Surgical   Other Topics Concern    Parent/sibling w/ CABG, MI or angioplasty before 65F 55M? No    Caffeine Concern Yes     Comment: coffee in the am    Sleep Concern No     Comment: since surgery been sleepy \"Ok\"    Stress Concern Yes     Comment: self employed    Exercise Yes     Comment: uses treadmill daily       Review of Systems:  Skin:          Eyes:         ENT:         Respiratory:  Positive for sleep apnea no device yet for EMMETT   Cardiovascular:  Negative      Gastroenterology:        Genitourinary:         Musculoskeletal:         Neurologic:         Psychiatric:         Heme/Lymph/Imm:         Endocrine:           Physical Exam:  Vitals: /66 (BP Location: Right arm, Patient Position: Sitting, Cuff Size: Adult Large)   Pulse 66   Ht 1.753 m (5' 9\")   Wt 95.7 kg (211 lb)   SpO2 94%   BMI 31.16 kg/m      Constitutional:  cooperative;in no acute distress        Skin:  warm and dry to the touch;no apparent skin lesions or masses noted          Head:  normocephalic        Eyes:           Lymph:      ENT:  no pallor or cyanosis        Neck:  carotid pulses are full and equal bilaterally;JVP normal        Respiratory:  clear to auscultation         Cardiac: regular rhythm;normal S1 and " S2;no murmurs, gallops or rubs detected     no presence of murmur          pulses full and equal                                        GI:  abdomen soft;no masses;no bruits        Extremities and Muscular Skeletal:  no edema;normal muscle strength and tone              Neurological:  no gross motor deficits        Psych:  Alert and Oriented x 3        CC  No referring provider defined for this encounter.      Thank you for allowing me to participate in the care of your patient.      Sincerely,     Merrick Plaza MD, MD     [facility]

## 2023-05-12 NOTE — PROGRESS NOTES
REASON FOR VISIT:  Followup for coronary artery disease.      HISTORY OF PRESENT ILLNESS:  I had the pleasure of seeing Tip Aguilar at the [facility] in [place] this morning.  He is a very pleasant 58-year-old gentleman with known coronary artery disease and hyperlipidemia.  He suffered an inferior ST elevation MI in 02/2016.  Coronary angiogram at that time demonstrated an occluded large right posterolateral branch and moderate severe mid RCA stenosis.  He had no significant disease elsewhere.  He subsequently underwent successful PCI with drug-eluting stent placement in the right posterolateral branch and the mid RCA.      He has done quite well from a cardiac point of view since then. I reviewed his most recent lipid profile.  His LDL is 108 which is not at goal.     IMPRESSION, REPORT, PLAN:   1.  Coronary artery disease.   2.  Status post prior inferior myocardial infarction and stenting of the mid RCA and right posterolateral branch.   3.  Hyperlipidemia   4.  Hypertension.      Mr. Aguilar continues to do well from a cardiac point of view.  He currently denies any exertional chest pain or shortness of breath.  His risk factors are well controlled with the exception of his hyperlipidemia.  In order to lower his LDL, will add Zetia 10 mg daily to his regimen.  Repeat lipid profile through primary care doctor.     We will see him in approximately 2 years for followup but sooner if he develops symptoms.      I appreciate the opportunity to be part of this patient's care.         SHRUTI DE LA VEGA MD        Today's clinic visit entailed:  30 minutes spent by me on the date of the encounter doing chart review, history and exam, documentation and further activities per the note  Provider  Link to [facility] Help Grid       Orders Placed This Encounter   Procedures     Follow-Up with Cardiologist       Orders Placed This Encounter   Medications     ezetimibe (ZETIA) 10 MG tablet     Sig: Take 1 tablet (10 mg) by  mouth daily     Dispense:  90 tablet     Refill:  3       Medications Discontinued During This Encounter   Medication Reason     metoprolol tartrate (LOPRESSOR) 25 MG tablet          Encounter Diagnoses   Name Primary?     Hyperlipidemia LDL goal <70      Coronary artery disease involving native coronary artery of native heart without angina pectoris Yes       CURRENT MEDICATIONS:  Current Outpatient Medications   Medication Sig Dispense Refill     aspirin 81 MG tablet Take 1 tablet (81 mg) by mouth daily 30 tablet      atorvastatin (LIPITOR) 80 MG tablet TAKE 1 TABLET BY MOUTH AT BEDTIME 90 tablet 0     Bioflavonoid Products (VITAMIN C) CHEW        ezetimibe (ZETIA) 10 MG tablet Take 1 tablet (10 mg) by mouth daily 90 tablet 3     lisinopril (ZESTRIL) 5 MG tablet Take 1 tablet by mouth once daily 90 tablet 0     sildenafil (REVATIO) 20 MG tablet TAKE 1 TO 5 TABLETS BY MOUTH ONCE DAILY AS NEEDED. DO NOT TAKE WITH NITROGLYCERIN, TERAZOSIN, OR DOXAZOSIN 40 tablet 0     vitamin E 400 units TABS Take 400 Units by mouth daily         ALLERGIES   No Known Allergies    PAST MEDICAL HISTORY:  Past Medical History:   Diagnosis Date     Ascending aorta dilation (H) 09/15/2020     ASCVD (arteriosclerotic cardiovascular disease)      CAD (coronary artery disease) 02/2016     ED (erectile dysfunction)      HLD (hyperlipidemia)      HTN (hypertension)      STEMI (ST elevation myocardial infarction) (H) 02/2016    s/p PAVITHRA to right posterolateral branch and mid RCA       PAST SURGICAL HISTORY:  Past Surgical History:   Procedure Laterality Date     CARDIAC CATHERIZATION  2/13/16    Successful PCI with PAVITHRA placement in the rPLA/distal RCA, mid RCA     VASCULAR SURGERY      Stent replace (coronary)       FAMILY HISTORY:  Family History   Problem Relation Age of Onset     Coronary Artery Disease Father 58        heart valve replacement     Myocardial Infarction Father      Diabetes Mother      Colon Cancer No family hx of   "      SOCIAL HISTORY:  Social History     Socioeconomic History     Marital status: Patient Declined     Spouse name: None     Number of children: None     Years of education: None     Highest education level: None   Tobacco Use     Smoking status: Never     Smokeless tobacco: Never   Substance and Sexual Activity     Alcohol use: Not Currently     Alcohol/week: 6.0 - 10.0 standard drinks of alcohol     Types: 6 - 10 Standard drinks or equivalent per week     Drug use: No     Sexual activity: Yes     Partners: Female     Birth control/protection: Surgical   Other Topics Concern     Parent/sibling w/ CABG, MI or angioplasty before 65F 55M? No     Caffeine Concern Yes     Comment: coffee in the am     Sleep Concern No     Comment: since surgery been sleepy \"Ok\"     Stress Concern Yes     Comment: self employed     Exercise Yes     Comment: uses treadmill daily       Review of Systems:  Skin:          Eyes:         ENT:         Respiratory:  Positive for sleep apnea no device yet for EMMETT   Cardiovascular:  Negative      Gastroenterology:        Genitourinary:         Musculoskeletal:         Neurologic:         Psychiatric:         Heme/Lymph/Imm:         Endocrine:           Physical Exam:  Vitals: /66 (BP Location: Right arm, Patient Position: Sitting, Cuff Size: Adult Large)   Pulse 66   Ht 1.753 m (5' 9\")   Wt 95.7 kg (211 lb)   SpO2 94%   BMI 31.16 kg/m      Constitutional:  cooperative;in no acute distress        Skin:  warm and dry to the touch;no apparent skin lesions or masses noted          Head:  normocephalic        Eyes:           Lymph:      ENT:  no pallor or cyanosis        Neck:  carotid pulses are full and equal bilaterally;JVP normal        Respiratory:  clear to auscultation         Cardiac: regular rhythm;normal S1 and S2;no murmurs, gallops or rubs detected     no presence of murmur          pulses full and equal                                        GI:  abdomen soft;no masses;no " bruits        Extremities and Muscular Skeletal:  no edema;normal muscle strength and tone              Neurological:  no gross motor deficits        Psych:  Alert and Oriented x 3        CC  No referring provider defined for this encounter.

## 2023-07-17 ENCOUNTER — PATIENT OUTREACH (OUTPATIENT)
Dept: GASTROENTEROLOGY | Facility: CLINIC | Age: 58
End: 2023-07-17
Payer: COMMERCIAL

## 2023-07-17 DIAGNOSIS — Z12.11 SPECIAL SCREENING FOR MALIGNANT NEOPLASMS, COLON: Primary | ICD-10-CM

## 2023-07-17 NOTE — PROGRESS NOTES
"Pt overdue for screening/surviellance colonocopy. Hx TVA, recall was in 1 year.     Ordering/Referring Provider: Scott Dunham  BMI: Estimated body mass index is 31.16 kg/m  as calculated from the following:    Height as of 5/12/23: 1.753 m (5' 9\").    Weight as of 5/12/23: 95.7 kg (211 lb).     Sedation:  Based on patient's medical history patient is appropriate for   moderate sedation. If patient's BMI > 50 do not schedule in ASC.    Location:  Does patient have an LVAD?  No    Does patient have a history of moderate to severe sleep apnea?  No    Does patient have a history of asthma, COPD or any other lung disease?  No    Does patient have a history of cardiac disease?  Yes     In the past 6 months, has the patient been hospitalized for any cardiac issues including cardiomyopathy, heart attack, or stent placement?  No    Does the patient have any implantable devices (pacemaker, ICD)?  No    Is patient awaiting a heart or lung transplant?   No    Has patient had a stroke or transient ischemic attack in the last 6 months?   No    Is the patient currently on dialysis?   No    Prep:  Previous prep (last colonoscopy):   MiraLAX (No Mag Citrate)    Quality of previous prep:   Good    Is patient currently on dialysis, is ESRD, or CKD stage 4/5?   No (standard prep)    Does patient have a diagnosis of diabetes?  No    Does patient have a diagnosis of cystic fibrosis (CF)?  No    BMI > 40?  No    Final Referral Status:  meets the criteria for placement of colonoscopy screening  referral order.      Referral order placed with the following recommendations:  Sedation: Moderate Sedation  Location Type: No Scheduling Restrictions  Prep: MiraLAX (No Mag Citrate)    Deborah Smith RN, BSN  Colorectal Cancer   Division of Gastroenterology at [facility]      "

## (undated) DEVICE — DEVICE RETRIEVAL ROTH NET PLATINUM UNIV 2.5MMX230CM 00715050

## (undated) DEVICE — ENDO SNARE POLYPECTOMY SPIRAL 20MM LOOP SD-230U-20

## (undated) DEVICE — ENDO TRAP POLYP QUICK CATCH 710201

## (undated) DEVICE — NDL SCLEROTHERAPY 25GA CARR-LOCK  00711811

## (undated) DEVICE — CLIP HEMOCLIP ENDOSCOPIC INSTINCT 2.8X230CM INSC-7-230-SS

## (undated) DEVICE — KIT ENDO TURNOVER/PROCEDURE W/CLEAN A SCOPE LINERS 103888

## (undated) DEVICE — ESU GROUND PAD ADULT W/CORD E7507

## (undated) RX ORDER — FENTANYL CITRATE 50 UG/ML
INJECTION, SOLUTION INTRAMUSCULAR; INTRAVENOUS
Status: DISPENSED
Start: 2019-06-10